# Patient Record
Sex: FEMALE | Race: ASIAN | NOT HISPANIC OR LATINO | Employment: UNEMPLOYED | URBAN - METROPOLITAN AREA
[De-identification: names, ages, dates, MRNs, and addresses within clinical notes are randomized per-mention and may not be internally consistent; named-entity substitution may affect disease eponyms.]

---

## 2017-01-18 DIAGNOSIS — R73.09 ELEVATED GLUCOSE: ICD-10-CM

## 2017-01-20 LAB
ALBUMIN SERPL-MCNC: 4.9 G/DL (ref 3.5–5.2)
ALBUMIN/GLOB SERPL: 2 G/DL
ALP SERPL-CCNC: 63 U/L (ref 39–117)
ALT SERPL-CCNC: 21 U/L (ref 1–33)
AST SERPL-CCNC: 25 U/L (ref 1–32)
BILIRUB SERPL-MCNC: 0.4 MG/DL (ref 0.1–1.2)
BUN SERPL-MCNC: 11 MG/DL (ref 6–20)
BUN/CREAT SERPL: 13.1 (ref 7–25)
CALCIUM SERPL-MCNC: 10 MG/DL (ref 8.6–10.5)
CHLORIDE SERPL-SCNC: 103 MMOL/L (ref 98–107)
CHOLEST SERPL-MCNC: 284 MG/DL (ref 0–200)
CO2 SERPL-SCNC: 28.2 MMOL/L (ref 22–29)
CREAT SERPL-MCNC: 0.84 MG/DL (ref 0.57–1)
GLOBULIN SER CALC-MCNC: 2.4 GM/DL
GLUCOSE SERPL-MCNC: 114 MG/DL (ref 65–99)
HBA1C MFR BLD: 5.5 % (ref 4.8–5.6)
HDLC SERPL-MCNC: 49 MG/DL (ref 40–60)
LDLC SERPL CALC-MCNC: 176 MG/DL (ref 0–100)
LDLC/HDLC SERPL: 3.6 {RATIO}
POTASSIUM SERPL-SCNC: 4.4 MMOL/L (ref 3.5–5.2)
PROT SERPL-MCNC: 7.3 G/DL (ref 6–8.5)
SODIUM SERPL-SCNC: 144 MMOL/L (ref 136–145)
TRIGL SERPL-MCNC: 293 MG/DL (ref 0–150)
VLDLC SERPL CALC-MCNC: 58.6 MG/DL (ref 5–40)

## 2017-01-24 ENCOUNTER — OFFICE VISIT (OUTPATIENT)
Dept: INTERNAL MEDICINE | Facility: CLINIC | Age: 62
End: 2017-01-24

## 2017-01-24 VITALS
HEIGHT: 61 IN | OXYGEN SATURATION: 96 % | DIASTOLIC BLOOD PRESSURE: 80 MMHG | BODY MASS INDEX: 26.73 KG/M2 | WEIGHT: 141.6 LBS | SYSTOLIC BLOOD PRESSURE: 120 MMHG | HEART RATE: 73 BPM

## 2017-01-24 DIAGNOSIS — E78.5 HYPERLIPIDEMIA, UNSPECIFIED HYPERLIPIDEMIA TYPE: ICD-10-CM

## 2017-01-24 DIAGNOSIS — E03.9 ADULT HYPOTHYROIDISM: Primary | ICD-10-CM

## 2017-01-24 PROCEDURE — 99214 OFFICE O/P EST MOD 30 MIN: CPT | Performed by: INTERNAL MEDICINE

## 2017-01-24 NOTE — MR AVS SNAPSHOT
Mariana Proctor   1/24/2017 8:15 AM   Office Visit    Dept Phone:  [phone]   Encounter #:  [id number]    Provider:  Clare Jara MD   Department:  [facility] INTERNAL MEDICINE                Your Full Care Plan              Today's Medication Changes          These changes are accurate as of: 1/24/17  8:49 AM.  If you have any questions, ask your nurse or doctor.               New Medication(s)Ordered:     pitavastatin calcium 2 MG tablet tablet   Commonly known as:  LIVALO   Take 1 tablet by mouth Every Night.   Started by:  Clare Jara MD         Stop taking medication(s)listed here:     ezetimibe 10 MG tablet   Commonly known as:  ZETIA   Stopped by:  Clare Jara MD                Where to Get Your Medications      These medications were sent to [id number] [place] [address] & [employer] [facility] [phone] [facility] [phone]   [id number] [place] [address]     Phone:  [phone]     pitavastatin calcium 2 MG tablet tablet                  Your Updated Medication List          This list is accurate as of: 1/24/17  8:49 AM.  Always use your most recent med list.                amitriptyline 10 MG tablet   Commonly known as:  ELAVIL   Take 1 tablet by mouth Every Night.       pitavastatin calcium 2 MG tablet tablet   Commonly known as:  LIVALO   Take 1 tablet by mouth Every Night.       SYNTHROID 88 MCG tablet   Generic drug:  levothyroxine   TAKE ONE TABLET BY MOUTH DAILY       vitamin D 55896 UNITS capsule capsule   Commonly known as:  ERGOCALCIFEROL   Take 1 capsule by mouth 1 (one) time per week.               You Were Diagnosed With        Codes Comments    Adult hypothyroidism    -  Primary ICD-10-CM: E03.9  ICD-9-CM: 244.9     Hyperlipidemia, unspecified hyperlipidemia type     ICD-10-CM: E78.5  ICD-9-CM: 272.4       Instructions     None    Patient Instructions History      Upcoming Appointments     Visit  "Type Date Time Department    OFFICE VISIT 2017  8:15 AM [facility]    [facility] 3/1/2017  8:50 AM [facility]    OFFICE VISIT 3/8/2017  7:45 AM [facility]      MyChart Signup     [facility] [employer] allows you to send messages to your doctor, view your test results, renew your prescriptions, schedule appointments, and more. To sign up, go to [employer] and click on the Sign Up Now link in the New User? box. Enter your [employer] Activation Code exactly as it appears below along with the last four digits of your Social Security Number and your Date of Birth () to complete the sign-up process. If you do not sign up before the expiration date, you must request a new code.    [employer] Activation Code: [email or web address]  Expires: 2/3/2017  8:53 AM    If you have questions, you can email [email or web address] or call [phone] to talk to our [employer] staff. Remember, [employer] is NOT to be used for urgent needs. For medical emergencies, dial 911.               Other Info from Your Visit           Your Appointments     Mar 01, 2017  8:50 AM EST   [facility]   [facility] INTERNAL MEDICINE (--)    [address] [place] [address]   [phone]            Mar 08, 2017  7:45 AM EST   Office Visit with Clare Jara MD   [facility] INTERNAL MEDICINE (--)    [address] [place] [address]   [phone]           Arrive 15 minutes prior to appointment.              Allergies     No Known Allergies      Reason for Visit     Hyperlipidemia     Hypothyroidism           Vital Signs     Blood Pressure Pulse Height Weight Oxygen Saturation Body Mass Index    120/80 (BP Location: Left arm, Patient Position: Sitting, Cuff Size: Adult) 73 60.5\" (153.7 cm) 141 lb 9.6 oz (64.2 kg) 96% 27.2 kg/m2    Smoking Status                   Never Smoker           Problems and Diagnoses Noted     Adult " hypothyroidism    High cholesterol or triglycerides

## 2017-01-24 NOTE — PROGRESS NOTES
Subjective   Mariana Proctor is a 61 y.o. female here to follow up on Hypothyroidism and High cholesterol.     History of Present Illness   SHe has not been able to tolerate statins  She has tried atorvastatin and lovastatin and zetia.  GI upset and muscle ache  Pt has been doing well with thyroid meds.  Taking as perscribed without any complications  She does think it makes her hot flashes worse    The following portions of the patient's history were reviewed and updated as appropriate: allergies, current medications, past medical history, past social history and problem list.  She does eat a healthy diet  She does not exercise reg  Review of Systems   All other systems reviewed and are negative.      Objective   Physical Exam   Constitutional: She is oriented to person, place, and time. She appears well-developed and well-nourished.   HENT:   Head: Normocephalic and atraumatic.   Right Ear: External ear normal.   Left Ear: External ear normal.   Mouth/Throat: Oropharynx is clear and moist.   Eyes: Conjunctivae and EOM are normal. Pupils are equal, round, and reactive to light.   Neck: Normal range of motion. No tracheal deviation present. No thyromegaly present.   Cardiovascular: Normal rate, regular rhythm, normal heart sounds and intact distal pulses.    Pulmonary/Chest: Effort normal and breath sounds normal.   Abdominal: Soft. Bowel sounds are normal. She exhibits no distension. There is no tenderness.   Musculoskeletal: Normal range of motion. She exhibits no edema or deformity.   Neurological: She is alert and oriented to person, place, and time.   Skin: Skin is warm and dry.   Psychiatric: She has a normal mood and affect. Her behavior is normal. Judgment and thought content normal.   Vitals reviewed.      Assessment/Plan   Mariana was seen today for hyperlipidemia and hypothyroidism.    Diagnoses and all orders for this visit:    Adult hypothyroidism  -     Comprehensive Metabolic Panel; Future  -      LP+LDL / HDL Ratio ([facility]); Future  -     TSH Rfx On Abnormal To Free T4; Future    Hyperlipidemia, unspecified hyperlipidemia type  -     Comprehensive Metabolic Panel; Future  -     LP+LDL / HDL Ratio ([facility]); Future  -     TSH Rfx On Abnormal To Free T4; Future    Other orders  -     pitavastatin calcium (LIVALO) 2 MG tablet tablet; Take 1 tablet by mouth Every Night.    1. HPL- she has not tolerated multiple statins and zetia  She has very high chol and a sig FH.  Try livavlo 2mg at night  2. Hypothyroidism- she is going to try to take the synthroid at night

## 2017-02-22 ENCOUNTER — APPOINTMENT (OUTPATIENT)
Dept: WOMENS IMAGING | Facility: HOSPITAL | Age: 62
End: 2017-02-22

## 2017-02-22 PROCEDURE — 77067 SCR MAMMO BI INCL CAD: CPT | Performed by: RADIOLOGY

## 2017-02-28 DIAGNOSIS — E78.5 HYPERLIPIDEMIA, UNSPECIFIED HYPERLIPIDEMIA TYPE: ICD-10-CM

## 2017-02-28 DIAGNOSIS — E03.9 ADULT HYPOTHYROIDISM: ICD-10-CM

## 2017-03-03 LAB
ALBUMIN SERPL-MCNC: 4.7 G/DL (ref 3.5–5.2)
ALBUMIN/GLOB SERPL: 2 G/DL
ALP SERPL-CCNC: 58 U/L (ref 39–117)
ALT SERPL-CCNC: 15 U/L (ref 1–33)
AST SERPL-CCNC: 19 U/L (ref 1–32)
BILIRUB SERPL-MCNC: 0.2 MG/DL (ref 0.1–1.2)
BUN SERPL-MCNC: 11 MG/DL (ref 6–20)
BUN/CREAT SERPL: 13.4 (ref 7–25)
CALCIUM SERPL-MCNC: 9.6 MG/DL (ref 8.6–10.5)
CHLORIDE SERPL-SCNC: 103 MMOL/L (ref 98–107)
CHOLEST SERPL-MCNC: 224 MG/DL (ref 0–200)
CO2 SERPL-SCNC: 28.3 MMOL/L (ref 22–29)
CREAT SERPL-MCNC: 0.82 MG/DL (ref 0.57–1)
GLOBULIN SER CALC-MCNC: 2.3 GM/DL
GLUCOSE SERPL-MCNC: 118 MG/DL (ref 65–99)
HDLC SERPL-MCNC: 40 MG/DL (ref 40–60)
LDLC SERPL CALC-MCNC: 126 MG/DL (ref 0–100)
LDLC/HDLC SERPL: 3.15 {RATIO}
POTASSIUM SERPL-SCNC: 4.3 MMOL/L (ref 3.5–5.2)
PROT SERPL-MCNC: 7 G/DL (ref 6–8.5)
SODIUM SERPL-SCNC: 143 MMOL/L (ref 136–145)
TRIGL SERPL-MCNC: 290 MG/DL (ref 0–150)
TSH SERPL DL<=0.005 MIU/L-ACNC: 1.01 MIU/ML (ref 0.27–4.2)
VLDLC SERPL CALC-MCNC: 58 MG/DL (ref 5–40)

## 2017-03-08 ENCOUNTER — OFFICE VISIT (OUTPATIENT)
Dept: INTERNAL MEDICINE | Facility: CLINIC | Age: 62
End: 2017-03-08

## 2017-03-08 VITALS
WEIGHT: 138.44 LBS | SYSTOLIC BLOOD PRESSURE: 120 MMHG | BODY MASS INDEX: 26.14 KG/M2 | HEART RATE: 77 BPM | DIASTOLIC BLOOD PRESSURE: 70 MMHG | HEIGHT: 61 IN | OXYGEN SATURATION: 98 %

## 2017-03-08 DIAGNOSIS — G47.00 INSOMNIA, UNSPECIFIED TYPE: ICD-10-CM

## 2017-03-08 DIAGNOSIS — E78.5 HYPERLIPIDEMIA, UNSPECIFIED HYPERLIPIDEMIA TYPE: Primary | ICD-10-CM

## 2017-03-08 DIAGNOSIS — G44.209 TENSION HEADACHE: ICD-10-CM

## 2017-03-08 PROCEDURE — 99213 OFFICE O/P EST LOW 20 MIN: CPT | Performed by: INTERNAL MEDICINE

## 2017-03-08 NOTE — PROGRESS NOTES
Subjective   Mariana Proctor is a 61 y.o. female.     Pt here to follow up on labs for HPL.  Pt has no concerns @ this time.    History of Present Illness   Pt has been taking cholesterol meds as prescribed.  She does have some muscle cramp mostly at night  She is not sure if it is the med or not   She says she is sleeping better with the amitriptyline that she had started for tension headaches.  Tension headaches seem to be better as well    The following portions of the patient's history were reviewed and updated as appropriate: allergies, current medications, past medical history, past social history and problem list.  She has been watching her diet    Review of Systems   All other systems reviewed and are negative.      Objective     Vitals:    03/08/17 0743   BP: 120/70   Pulse: 77   SpO2: 98%       Physical Exam   Constitutional: She is oriented to person, place, and time. She appears well-developed and well-nourished.   HENT:   Head: Normocephalic and atraumatic.   Right Ear: External ear normal.   Left Ear: External ear normal.   Mouth/Throat: Oropharynx is clear and moist.   Eyes: Conjunctivae and EOM are normal. Pupils are equal, round, and reactive to light.   Neck: Normal range of motion. No tracheal deviation present. No thyromegaly present.   Cardiovascular: Normal rate, regular rhythm, normal heart sounds and intact distal pulses.    Pulmonary/Chest: Effort normal and breath sounds normal.   Abdominal: Soft. Bowel sounds are normal. She exhibits no distension. There is no tenderness.   Musculoskeletal: Normal range of motion. She exhibits no edema or deformity.   Neurological: She is alert and oriented to person, place, and time.   Skin: Skin is warm and dry.   Psychiatric: She has a normal mood and affect. Her behavior is normal. Judgment and thought content normal.   Vitals reviewed.      Assessment/Plan   There are no diagnoses linked to this encounter.    1. HPL-some leg cramps at night but no  significant muscle aches.  I do not know whether or not this is from the Livalo.  We will go ahead and add some magnesium.  As long as patient's symptoms are doing okay she will continue this.  She will let me know if she does not improve  2. Insomnia-patient seems to be sleeping better.  We will continue the amitriptyline  3.  Headaches seem to be better with amitriptyline

## 2017-05-10 ENCOUNTER — OFFICE VISIT (OUTPATIENT)
Dept: INTERNAL MEDICINE | Facility: CLINIC | Age: 62
End: 2017-05-10

## 2017-05-10 VITALS
BODY MASS INDEX: 25.59 KG/M2 | HEIGHT: 61 IN | SYSTOLIC BLOOD PRESSURE: 120 MMHG | HEART RATE: 59 BPM | WEIGHT: 135.56 LBS | OXYGEN SATURATION: 98 % | DIASTOLIC BLOOD PRESSURE: 70 MMHG

## 2017-05-10 DIAGNOSIS — J06.9 ACUTE URI: Primary | ICD-10-CM

## 2017-05-10 DIAGNOSIS — J30.2 SEASONAL ALLERGIC RHINITIS, UNSPECIFIED ALLERGIC RHINITIS TRIGGER: ICD-10-CM

## 2017-05-10 PROCEDURE — 99213 OFFICE O/P EST LOW 20 MIN: CPT | Performed by: INTERNAL MEDICINE

## 2017-05-10 RX ORDER — AZELASTINE HYDROCHLORIDE, FLUTICASONE PROPIONATE 137; 50 UG/1; UG/1
2 SPRAY, METERED NASAL DAILY
Qty: 23 G | Refills: 2 | Status: SHIPPED | OUTPATIENT
Start: 2017-05-10 | End: 2018-03-14

## 2017-06-19 RX ORDER — LEVOTHYROXINE SODIUM 88 MCG
TABLET ORAL
Qty: 30 TABLET | Refills: 5 | Status: SHIPPED | OUTPATIENT
Start: 2017-06-19 | End: 2017-12-11 | Stop reason: SDUPTHER

## 2017-06-26 ENCOUNTER — OFFICE VISIT (OUTPATIENT)
Dept: INTERNAL MEDICINE | Facility: CLINIC | Age: 62
End: 2017-06-26

## 2017-06-26 VITALS
OXYGEN SATURATION: 96 % | SYSTOLIC BLOOD PRESSURE: 99 MMHG | DIASTOLIC BLOOD PRESSURE: 70 MMHG | BODY MASS INDEX: 25.51 KG/M2 | HEIGHT: 61 IN | HEART RATE: 63 BPM | WEIGHT: 135.1 LBS

## 2017-06-26 DIAGNOSIS — M54.9 UPPER BACK PAIN ON RIGHT SIDE: Primary | ICD-10-CM

## 2017-06-26 PROCEDURE — 99213 OFFICE O/P EST LOW 20 MIN: CPT | Performed by: INTERNAL MEDICINE

## 2017-06-26 NOTE — PROGRESS NOTES
Subjective   Mariana Proctor is a 62 y.o. female here today for body aches all over and neck pain that radiates down right arm.      History of Present Illness   She has pain medial to right shoulder blade and into right arm.  Worse in the am and at night  Worse when lying down.  Occasionally the pain radiates down her right arm.  She denies any chest pain.  No shortness of breath.  She does not know if it is really significantly worse than her baseline she has just been dealing with it on and off for many years    The following portions of the patient's history were reviewed and updated as appropriate: allergies, current medications, past medical history, past social history and problem list.  She denies any change in PA    Review of Systems   Musculoskeletal: Positive for back pain and neck pain.   All other systems reviewed and are negative.      Objective   Physical Exam   Constitutional: She is oriented to person, place, and time. She appears well-developed and well-nourished.   HENT:   Head: Normocephalic and atraumatic.   Right Ear: External ear normal.   Left Ear: External ear normal.   Mouth/Throat: Oropharynx is clear and moist.   Eyes: Conjunctivae and EOM are normal. Pupils are equal, round, and reactive to light.   Neck: Normal range of motion. No tracheal deviation present. No thyromegaly present.   Cardiovascular: Normal rate, regular rhythm, normal heart sounds and intact distal pulses.    Pulmonary/Chest: Effort normal and breath sounds normal.   Abdominal: Soft. Bowel sounds are normal. She exhibits no distension. There is no tenderness.   Musculoskeletal: Normal range of motion. She exhibits no edema or deformity.   Neurological: She is alert and oriented to person, place, and time.   Skin: Skin is warm and dry.   Psychiatric: She has a normal mood and affect. Her behavior is normal. Judgment and thought content normal.   Vitals reviewed.      Vitals:    06/26/17 0805   BP: 99/70   Pulse: 63   SpO2:  96%        Current Outpatient Prescriptions:   •  Azelastine-Fluticasone 137-50 MCG/ACT suspension, 2 sprays into each nostril Daily., Disp: 23 g, Rfl: 2  •  SYNTHROID 88 MCG tablet, TAKE ONE TABLET BY MOUTH DAILY, Disp: 30 tablet, Rfl: 5  •  vitamin D (ERGOCALCIFEROL) 22802 UNITS capsule capsule, Take 1 capsule by mouth 1 (one) time per week., Disp: 4 capsule, Rfl: 5      Assessment/Plan   Diagnoses and all orders for this visit:    Upper back pain on right side      1.  Upper back pain - sx for years  She denies working at a computer.  I discussed with patient different exercises to do that can help.  She did have a CT scan of the chest done several months back and again the symptoms have been going on for many years.  There is no significant worsening in her symptoms.  I'm going to refer her to physical therapy.  She will let me know if she does not improve with this

## 2017-07-31 ENCOUNTER — OFFICE VISIT (OUTPATIENT)
Dept: INTERNAL MEDICINE | Facility: CLINIC | Age: 62
End: 2017-07-31

## 2017-07-31 ENCOUNTER — HOSPITAL ENCOUNTER (OUTPATIENT)
Dept: GENERAL RADIOLOGY | Facility: HOSPITAL | Age: 62
Discharge: HOME | End: 2017-07-31
Admitting: NURSE PRACTITIONER

## 2017-07-31 VITALS
SYSTOLIC BLOOD PRESSURE: 138 MMHG | OXYGEN SATURATION: 98 % | HEIGHT: 61 IN | WEIGHT: 133.31 LBS | DIASTOLIC BLOOD PRESSURE: 82 MMHG | BODY MASS INDEX: 25.17 KG/M2 | HEART RATE: 83 BPM

## 2017-07-31 DIAGNOSIS — M54.9 UPPER BACK PAIN ON LEFT SIDE: Primary | ICD-10-CM

## 2017-07-31 PROCEDURE — 99213 OFFICE O/P EST LOW 20 MIN: CPT | Performed by: NURSE PRACTITIONER

## 2017-07-31 PROCEDURE — 72072 X-RAY EXAM THORAC SPINE 3VWS: CPT

## 2017-07-31 PROCEDURE — 96372 THER/PROPH/DIAG INJ SC/IM: CPT | Performed by: NURSE PRACTITIONER

## 2017-07-31 RX ORDER — CYCLOBENZAPRINE HCL 5 MG
5 TABLET ORAL 3 TIMES DAILY PRN
Qty: 30 TABLET | Refills: 0 | Status: SHIPPED | OUTPATIENT
Start: 2017-07-31 | End: 2017-09-06

## 2017-07-31 NOTE — PROGRESS NOTES
Subjective   Mariana Proctor is a 62 y.o. female. Patient is here today for   Chief Complaint   Patient presents with   • Back Pain     Pt complains of having upper back pain. Pt states that physical therapy thinks she should get an MRI.    .    History of Present Illness   C/O constant pain in upper back associated with pain down both arms, but left is worse. Left arm feels weak. States that she is having sharp pain.   She has been taking Ibuprofen with minimal relief. She has been in physical therapy. Patient states that the physical therapist recommends that she gets an MRI.  Patient has had this pain off and on for years and it usually will get better, but this has been pretty constant for about a month.  Denies any type of injury.    The following portions of the patient's history were reviewed and updated as appropriate: allergies, current medications, past family history, past medical history, past social history, past surgical history and problem list.    Review of Systems   Constitutional: Negative.    Respiratory: Negative.    Cardiovascular: Negative.    Musculoskeletal: Positive for back pain.       Objective   Vitals:    07/31/17 1430   BP: 138/82   Pulse: 83   SpO2: 98%     Physical Exam   Constitutional: Vital signs are normal. She appears well-developed and well-nourished.   Cardiovascular: Normal rate, regular rhythm and normal heart sounds.    Pulmonary/Chest: Effort normal and breath sounds normal.   Musculoskeletal:        Right shoulder: She exhibits tenderness and spasm. She exhibits normal range of motion, no bony tenderness and no swelling.        Arms:  Area of tenderness   Skin: Skin is warm, dry and intact.   Psychiatric: She has a normal mood and affect. Her speech is normal and behavior is normal. Thought content normal.   Nursing note and vitals reviewed.      Assessment/Plan   Mariana was seen today for back pain.    Diagnoses and all orders for this visit:    Upper back pain on left  side  -     XR spine thoracic 3 vw  -     ketorolac (TORADOL) injection 30 mg; Inject 1 mL into the shoulder, thigh, or buttocks 1 (One) Time.  -     cyclobenzaprine (FLEXERIL) 5 MG tablet; Take 1 tablet by mouth 3 (Three) Times a Day As Needed for Muscle Spasms.     explained to patient that we would need to get an x-ray prior to ordering an MRI.  Patient has had a CT scan of her chest in the past, but that was just to look at the stability of some pulmonary nodules.

## 2017-08-01 ENCOUNTER — TELEPHONE (OUTPATIENT)
Dept: INTERNAL MEDICINE | Facility: CLINIC | Age: 62
End: 2017-08-01

## 2017-08-01 DIAGNOSIS — M54.9 UPPER BACK PAIN ON LEFT SIDE: Primary | ICD-10-CM

## 2017-08-01 DIAGNOSIS — E78.5 HYPERLIPIDEMIA, UNSPECIFIED HYPERLIPIDEMIA TYPE: ICD-10-CM

## 2017-08-01 NOTE — TELEPHONE ENCOUNTER
----- Message from RONNA Mancia sent at 8/1/2017  1:14 PM EDT -----  Please let patient know that her x-ray showed minimal arthritis changes. I will refer her to ortho/back specialist. They can determine if an MRI is needed or different treatment

## 2017-09-02 LAB
ALBUMIN SERPL-MCNC: 4.7 G/DL (ref 3.5–5.2)
ALBUMIN/GLOB SERPL: 1.9 G/DL
ALP SERPL-CCNC: 57 U/L (ref 39–117)
ALT SERPL-CCNC: 15 U/L (ref 1–33)
AST SERPL-CCNC: 20 U/L (ref 1–32)
BILIRUB SERPL-MCNC: 0.2 MG/DL (ref 0.1–1.2)
BUN SERPL-MCNC: 12 MG/DL (ref 8–23)
BUN/CREAT SERPL: 17.4 (ref 7–25)
CALCIUM SERPL-MCNC: 9.8 MG/DL (ref 8.6–10.5)
CHLORIDE SERPL-SCNC: 106 MMOL/L (ref 98–107)
CHOLEST SERPL-MCNC: 227 MG/DL (ref 0–200)
CO2 SERPL-SCNC: 26.4 MMOL/L (ref 22–29)
CREAT SERPL-MCNC: 0.69 MG/DL (ref 0.57–1)
GLOBULIN SER CALC-MCNC: 2.5 GM/DL
GLUCOSE SERPL-MCNC: 110 MG/DL (ref 65–99)
HCV AB S/CO SERPL IA: <0.1 S/CO RATIO (ref 0–0.9)
HDLC SERPL-MCNC: 43 MG/DL (ref 40–60)
LDLC SERPL CALC-MCNC: 137 MG/DL (ref 0–100)
LDLC/HDLC SERPL: 3.19 {RATIO}
POTASSIUM SERPL-SCNC: 4.1 MMOL/L (ref 3.5–5.2)
PROT SERPL-MCNC: 7.2 G/DL (ref 6–8.5)
SODIUM SERPL-SCNC: 145 MMOL/L (ref 136–145)
TRIGL SERPL-MCNC: 235 MG/DL (ref 0–150)
TSH SERPL DL<=0.005 MIU/L-ACNC: 1.2 MIU/ML (ref 0.27–4.2)
VLDLC SERPL CALC-MCNC: 47 MG/DL (ref 5–40)

## 2017-09-06 ENCOUNTER — OFFICE VISIT (OUTPATIENT)
Dept: INTERNAL MEDICINE | Facility: CLINIC | Age: 62
End: 2017-09-06

## 2017-09-06 VITALS
SYSTOLIC BLOOD PRESSURE: 110 MMHG | WEIGHT: 137.7 LBS | DIASTOLIC BLOOD PRESSURE: 68 MMHG | OXYGEN SATURATION: 98 % | HEART RATE: 72 BPM | HEIGHT: 60 IN | BODY MASS INDEX: 27.03 KG/M2

## 2017-09-06 DIAGNOSIS — E78.5 HYPERLIPIDEMIA, UNSPECIFIED HYPERLIPIDEMIA TYPE: Primary | ICD-10-CM

## 2017-09-06 DIAGNOSIS — R30.0 DYSURIA: ICD-10-CM

## 2017-09-06 DIAGNOSIS — E04.1 THYROID NODULE: ICD-10-CM

## 2017-09-06 DIAGNOSIS — E03.9 ADULT HYPOTHYROIDISM: ICD-10-CM

## 2017-09-06 DIAGNOSIS — E55.9 AVITAMINOSIS D: ICD-10-CM

## 2017-09-06 LAB
BILIRUB BLD-MCNC: NEGATIVE MG/DL
CLARITY, POC: CLEAR
COLOR UR: YELLOW
GLUCOSE UR STRIP-MCNC: NEGATIVE MG/DL
KETONES UR QL: NEGATIVE
LEUKOCYTE EST, POC: ABNORMAL
NITRITE UR-MCNC: NEGATIVE MG/ML
PH UR: 5.5 [PH] (ref 5–8)
PROT UR STRIP-MCNC: NEGATIVE MG/DL
RBC # UR STRIP: ABNORMAL /UL
SP GR UR: 1 (ref 1–1.03)
UROBILINOGEN UR QL: NORMAL

## 2017-09-06 PROCEDURE — 99213 OFFICE O/P EST LOW 20 MIN: CPT | Performed by: INTERNAL MEDICINE

## 2017-09-06 PROCEDURE — 81003 URINALYSIS AUTO W/O SCOPE: CPT | Performed by: INTERNAL MEDICINE

## 2017-09-06 NOTE — PROGRESS NOTES
Subjective   Mariana Proctor is a 62 y.o. female here today to f/u on hypothyroidism, hyperlipidemia and vitamin d def.  Pt c/o her thyroid hurting.      History of Present Illness   She says she feels some pain over her thyroid on the right  No dysphagia  Pt has been doing well with thyroid meds.  Taking as perscribed without any complications  Patient does report some difficulty with urinary leakage.    The following portions of the patient's history were reviewed and updated as appropriate: allergies, current medications, past medical history, past social history and problem list.  She does eat  Healthy diet    Review of Systems   All other systems reviewed and are negative.      Objective   Physical Exam   Constitutional: She is oriented to person, place, and time. She appears well-developed and well-nourished.   HENT:   Head: Normocephalic and atraumatic.   Right Ear: External ear normal.   Left Ear: External ear normal.   Mouth/Throat: Oropharynx is clear and moist.   Eyes: Conjunctivae and EOM are normal. Pupils are equal, round, and reactive to light.   Neck: Normal range of motion. No tracheal deviation present. No thyromegaly present.   Cardiovascular: Normal rate, regular rhythm, normal heart sounds and intact distal pulses.    Pulmonary/Chest: Effort normal and breath sounds normal.   Abdominal: Soft. Bowel sounds are normal. She exhibits no distension. There is no tenderness.   Musculoskeletal: Normal range of motion. She exhibits no edema or deformity.   Neurological: She is alert and oriented to person, place, and time.   Skin: Skin is warm and dry.   Psychiatric: She has a normal mood and affect. Her behavior is normal. Judgment and thought content normal.   Vitals reviewed.      Vitals:    09/06/17 0759   BP: 110/68   Pulse: 72   SpO2: 98%     Telephone on 08/01/2017   Component Date Value Ref Range Status   • Total Cholesterol 09/01/2017 227* 0 - 200 mg/dL Final   • Triglycerides 09/01/2017 235* 0 -  150 mg/dL Final   • HDL Cholesterol 09/01/2017 43  40 - 60 mg/dL Final   • VLDL Cholesterol 09/01/2017 47* 5 - 40 mg/dL Final   • LDL Cholesterol  09/01/2017 137* 0 - 100 mg/dL Final   • LDL/HDL Ratio 09/01/2017 3.19   Final   • TSH 09/01/2017 1.20  0.27 - 4.2 mIU/mL Final   • Hep C Virus Ab 09/01/2017 <0.1  0.0 - 0.9 s/co ratio Final    Comment:                                   Negative:     < 0.8                               Indeterminate: 0.8 - 0.9                                    Positive:     > 0.9   The CDC recommends that a positive HCV antibody result   be followed up with a HCV Nucleic Acid Amplification   test ([id number]).     • Glucose 09/01/2017 110* 65 - 99 mg/dL Final   • BUN 09/01/2017 12  8 - 23 mg/dL Final   • Creatinine 09/01/2017 0.69  0.57 - 1.00 mg/dL Final   • eGFR Non  Am 09/01/2017 87  >60 mL/min/1.73 Final   • eGFR African Am 09/01/2017 105  >60 mL/min/1.73 Final   • BUN/Creatinine Ratio 09/01/2017 17.4  7.0 - 25.0 Final   • Sodium 09/01/2017 145  136 - 145 mmol/L Final   • Potassium 09/01/2017 4.1  3.5 - 5.2 mmol/L Final   • Chloride 09/01/2017 106  98 - 107 mmol/L Final   • Total CO2 09/01/2017 26.4  22.0 - 29.0 mmol/L Final   • Calcium 09/01/2017 9.8  8.6 - 10.5 mg/dL Final   • Total Protein 09/01/2017 7.2  6.0 - 8.5 g/dL Final   • Albumin 09/01/2017 4.70  3.50 - 5.20 g/dL Final   • Globulin 09/01/2017 2.5  gm/dL Final   • A/G Ratio 09/01/2017 1.9  g/dL Final   • Total Bilirubin 09/01/2017 0.2  0.1 - 1.2 mg/dL Final   • Alkaline Phosphatase 09/01/2017 57  39 - 117 U/L Final   • AST (SGOT) 09/01/2017 20  1 - 32 U/L Final   • ALT (SGPT) 09/01/2017 15  1 - 33 U/L Final       Current Outpatient Prescriptions:   •  Azelastine-Fluticasone 137-50 MCG/ACT suspension, 2 sprays into each nostril Daily., Disp: 23 g, Rfl: 2  •  SYNTHROID 88 MCG tablet, TAKE ONE TABLET BY MOUTH DAILY, Disp: 30 tablet, Rfl: 5       Assessment/Plan   There are no diagnoses linked to this encounter.    1. HPl-   She is going to cont to work on diet and exercise  2. Hypothyroidism- She is doing well with current dose  3.  Dysuria-  She has some wbc and RBCs-  Given her sx we herson check a culture  SHe has had a neg butler for hematuria in the past

## 2017-09-08 LAB
BACTERIA UR CULT: NO GROWTH
BACTERIA UR CULT: NORMAL

## 2017-09-11 ENCOUNTER — HOSPITAL ENCOUNTER (OUTPATIENT)
Dept: ULTRASOUND IMAGING | Facility: HOSPITAL | Age: 62
Discharge: HOME | End: 2017-09-11
Admitting: INTERNAL MEDICINE

## 2017-09-11 DIAGNOSIS — E04.1 THYROID NODULE: ICD-10-CM

## 2017-09-11 PROCEDURE — 76536 US EXAM OF HEAD AND NECK: CPT

## 2017-09-11 RX ORDER — ERGOCALCIFEROL 1.25 MG/1
50000 CAPSULE ORAL
Qty: 4 CAPSULE | Refills: 0 | Status: SHIPPED | OUTPATIENT
Start: 2017-09-11 | End: 2018-03-14

## 2017-09-11 RX ORDER — ERGOCALCIFEROL 1.25 MG/1
CAPSULE ORAL
Qty: 4 CAPSULE | OUTPATIENT
Start: 2017-09-11

## 2017-09-18 ENCOUNTER — TELEPHONE (OUTPATIENT)
Dept: INTERNAL MEDICINE | Facility: CLINIC | Age: 62
End: 2017-09-18

## 2017-09-18 NOTE — TELEPHONE ENCOUNTER
----- Message from Shanna Talamantes sent at 9/18/2017  1:45 PM EDT -----  Regarding: US RESULTS  Patient asking about her US results. Please call.

## 2017-12-12 RX ORDER — LEVOTHYROXINE SODIUM 88 MCG
TABLET ORAL
Qty: 30 TABLET | Refills: 5 | Status: SHIPPED | OUTPATIENT
Start: 2017-12-12 | End: 2018-06-14 | Stop reason: SDUPTHER

## 2018-03-06 ENCOUNTER — RESULTS ENCOUNTER (OUTPATIENT)
Dept: INTERNAL MEDICINE | Facility: CLINIC | Age: 63
End: 2018-03-06

## 2018-03-06 DIAGNOSIS — E78.5 HYPERLIPIDEMIA, UNSPECIFIED HYPERLIPIDEMIA TYPE: ICD-10-CM

## 2018-03-06 DIAGNOSIS — E03.9 ADULT HYPOTHYROIDISM: ICD-10-CM

## 2018-03-06 DIAGNOSIS — E55.9 AVITAMINOSIS D: ICD-10-CM

## 2018-03-13 LAB
25(OH)D3+25(OH)D2 SERPL-MCNC: 14.7 NG/ML (ref 30–100)
ALBUMIN SERPL-MCNC: 4.3 G/DL (ref 3.5–5.2)
ALBUMIN/GLOB SERPL: 1.8 G/DL
ALP SERPL-CCNC: 56 U/L (ref 39–117)
ALT SERPL-CCNC: 18 U/L (ref 1–33)
AST SERPL-CCNC: 17 U/L (ref 1–32)
BILIRUB SERPL-MCNC: 0.5 MG/DL (ref 0.1–1.2)
BUN SERPL-MCNC: 14 MG/DL (ref 8–23)
BUN/CREAT SERPL: 19.7 (ref 7–25)
CALCIUM SERPL-MCNC: 9.6 MG/DL (ref 8.6–10.5)
CHLORIDE SERPL-SCNC: 104 MMOL/L (ref 98–107)
CHOLEST SERPL-MCNC: 251 MG/DL (ref 0–200)
CO2 SERPL-SCNC: 27.1 MMOL/L (ref 22–29)
CREAT SERPL-MCNC: 0.71 MG/DL (ref 0.57–1)
GFR SERPLBLD CREATININE-BSD FMLA CKD-EPI: 102 ML/MIN/1.73
GFR SERPLBLD CREATININE-BSD FMLA CKD-EPI: 84 ML/MIN/1.73
GLOBULIN SER CALC-MCNC: 2.4 GM/DL
GLUCOSE SERPL-MCNC: 109 MG/DL (ref 65–99)
HDLC SERPL-MCNC: 41 MG/DL (ref 40–60)
LDLC SERPL CALC-MCNC: 146 MG/DL (ref 0–100)
LDLC/HDLC SERPL: 3.57 {RATIO}
POTASSIUM SERPL-SCNC: 4.1 MMOL/L (ref 3.5–5.2)
PROT SERPL-MCNC: 6.7 G/DL (ref 6–8.5)
SODIUM SERPL-SCNC: 144 MMOL/L (ref 136–145)
TRIGL SERPL-MCNC: 318 MG/DL (ref 0–150)
TSH SERPL DL<=0.005 MIU/L-ACNC: 0.45 MIU/ML (ref 0.27–4.2)
VLDLC SERPL CALC-MCNC: 63.6 MG/DL (ref 5–40)

## 2018-03-14 ENCOUNTER — OFFICE VISIT (OUTPATIENT)
Dept: INTERNAL MEDICINE | Facility: CLINIC | Age: 63
End: 2018-03-14

## 2018-03-14 VITALS
SYSTOLIC BLOOD PRESSURE: 128 MMHG | HEART RATE: 71 BPM | WEIGHT: 139.7 LBS | TEMPERATURE: 98 F | OXYGEN SATURATION: 95 % | DIASTOLIC BLOOD PRESSURE: 82 MMHG | BODY MASS INDEX: 27.43 KG/M2 | HEIGHT: 60 IN

## 2018-03-14 DIAGNOSIS — E55.9 AVITAMINOSIS D: ICD-10-CM

## 2018-03-14 DIAGNOSIS — Z00.00 HEALTH CARE MAINTENANCE: Primary | ICD-10-CM

## 2018-03-14 DIAGNOSIS — K21.9 GASTROESOPHAGEAL REFLUX DISEASE, ESOPHAGITIS PRESENCE NOT SPECIFIED: ICD-10-CM

## 2018-03-14 DIAGNOSIS — E03.9 ADULT HYPOTHYROIDISM: ICD-10-CM

## 2018-03-14 DIAGNOSIS — Z12.11 SCREENING FOR COLON CANCER: ICD-10-CM

## 2018-03-14 PROCEDURE — 99396 PREV VISIT EST AGE 40-64: CPT | Performed by: INTERNAL MEDICINE

## 2018-03-14 RX ORDER — ERGOCALCIFEROL 1.25 MG/1
50000 CAPSULE ORAL WEEKLY
Qty: 8 CAPSULE | Refills: 1 | Status: SHIPPED | OUTPATIENT
Start: 2018-03-14

## 2018-03-14 RX ORDER — OMEPRAZOLE 40 MG/1
40 CAPSULE, DELAYED RELEASE ORAL DAILY
Qty: 30 CAPSULE | Refills: 2 | Status: SHIPPED | OUTPATIENT
Start: 2018-03-14 | End: 2022-01-10

## 2018-03-14 NOTE — PROGRESS NOTES
"Subjective   Mariana Proctor is a 62 y.o. female and is here for a comprehensive physical exam. The patient reports problems - hypothyroidism.  Pt has been doing well with thyroid meds.  Taking as perscribed without any complications    Pt is UTD with annual gyn exam and mammo utd with gyn    Do you take any herbs or supplements that were not prescribed by a doctor? none      Social History: She does not exercise reg  She does eat a healthy diet most of the time      Social History     Social History   • Marital status:      Spouse name: Britton Proctor   • Number of children: 3   • Years of education: N/A     Occupational History   • homemaker      Social History Main Topics   • Smoking status: Never Smoker   • Smokeless tobacco: Never Used   • Alcohol use No   • Drug use: No   • Sexual activity: Not on file     Other Topics Concern   • Not on file     Social History Narrative    [age] and 25 yo    2 grandchildren       Family History:   Family History   Problem Relation Age of Onset   • Diabetes Mother    • Hypertension Mother    • Hypertension Father    • Stroke Father 47   • Heart disease Brother        Past Medical History:   Past Medical History:   Diagnosis Date   • Anxiety    • GERD (gastroesophageal reflux disease)    • HLD (hyperlipidemia)    • HTN (hypertension)    • Hypothyroidism    • IBS (irritable bowel syndrome)    • Prediabetes    • Vitamin B deficiency    • Vitamin D deficiency            Review of Systems    A comprehensive review of systems was negative.    Objective   /82 (BP Location: Left arm, Patient Position: Sitting)   Pulse 71   Temp 98 °F (36.7 °C) (Oral)   Ht 152.5 cm (60.05\")   Wt 63.4 kg (139 lb 11.2 oz)   SpO2 95%   BMI 27.24 kg/m²     General Appearance:    Alert, cooperative, no distress, appears stated age   Head:    Normocephalic, without obvious abnormality, atraumatic   Eyes:    PERRL, conjunctiva/corneas clear, EOM's intact, fundi     benign, both eyes   Ears:    " Normal TM's and external ear canals, both ears   Nose:   Nares normal, septum midline, mucosa normal, no drainage    or sinus tenderness   Throat:   Lips, mucosa, and tongue normal; teeth and gums normal   Neck:   Supple, symmetrical, trachea midline, no adenopathy;     thyroid:  no enlargement/tenderness/nodules; no carotid    bruit or JVD   Back:     Symmetric, no curvature, ROM normal, no CVA tenderness   Lungs:     Clear to auscultation bilaterally, respirations unlabored   Chest Wall:    No tenderness or deformity    Heart:    Regular rate and rhythm, S1 and S2 normal, no murmur, rub   or gallop       Abdomen:     Soft, non-tender, bowel sounds active all four quadrants,     no masses, no organomegaly           Extremities:   Extremities normal, atraumatic, no cyanosis or edema   Pulses:   2+ and symmetric all extremities   Skin:   Skin color, texture, turgor normal, no rashes or lesions   Lymph nodes:   Cervical, supraclavicular, and axillary nodes normal   Neurologic:   CNII-XII intact, normal strength, sensation and reflexes     throughout       Medications:   Current Outpatient Prescriptions:   •  Probiotic Product (PROBIOTIC DAILY PO), Take 1 tablet by mouth Daily., Disp: , Rfl:   •  diphenoxylate-atropine (LOMOTIL) 2.5-0.025 MG per tablet, Take 2 tablets by mouth 4 (Four) Times a Day As Needed for Diarrhea., Disp: 16 tablet, Rfl: 0  •  SYNTHROID 88 MCG tablet, TAKE ONE TABLET BY MOUTH DAILY, Disp: 30 tablet, Rfl: 5       Assessment/Plan   Healthy female exam.      1. Healthcare Maintenance:  2. Patient Counseling:  --Nutrition: Stressed importance of moderation in sodium/caffeine intake, saturated fat and cholesterol, caloric balance, sufficient intake of fresh fruits, vegetables, fiber, calcium and vit D  --Exercise: I have rec daily exercise with light weights  --Substance Abuse: no to rare etoh  --Dental health: She has not been lately but she is going to schedule  --Immunizations  [email or web address]  --Discussed benefits of screening colonoscopy.  3. GERD- she is having more sx and take occas OTC meds.  We will try omeprazole  4.  HPL- she is going to work on diet and exercise

## 2018-03-14 NOTE — PATIENT INSTRUCTIONS
"Fat and Cholesterol Restricted Diet  Getting too much fat and cholesterol in your diet may cause health problems. Following this diet helps keep your fat and cholesterol at normal levels. This can keep you from getting sick.  What types of fat should I choose?  · Choose monosaturated and polyunsaturated fats. These are found in foods such as olive oil, canola oil, flaxseeds, walnuts, almonds, and seeds.  · Eat more omega-3 fats. Good choices include salmon, mackerel, sardines, tuna, flaxseed oil, and ground flaxseeds.  · Limit saturated fats. These are in animal products such as meats, butter, and cream. They can also be in plant products such as palm oil, palm kernel oil, and coconut oil.  · Avoid foods with partially hydrogenated oils in them. These contain trans fats. Examples of foods that have trans fats are stick margarine, some tub margarines, cookies, crackers, and other baked goods.  What general guidelines do I need to follow?  · Check food labels. Look for the words \"trans fat\" and \"saturated fat.\"  · When preparing a meal:  ¨ Fill half of your plate with vegetables and green salads.  ¨ Fill one fourth of your plate with whole grains. Look for the word \"whole\" as the first word in the ingredient list.  ¨ Fill one fourth of your plate with lean protein foods.  · Eat more foods that have fiber, like apples, carrots, beans, peas, and barley.  · Eat more home-cooked foods. Eat less at restaurants and buffets.  · Limit or avoid alcohol.  · Limit foods high in starch and sugar.  · Limit fried foods.  · Cook foods without frying them. Baking, boiling, grilling, and broiling are all great options.  · Lose weight if you are overweight. Losing even a small amount of weight can help your overall health. It can also help prevent diseases such as diabetes and heart disease.  What foods can I eat?  Grains   Whole grains, such as whole wheat or whole grain breads, crackers, cereals, and pasta. Unsweetened oatmeal, " bulgur, barley, quinoa, or brown rice. Corn or whole wheat flour tortillas.  Vegetables   Fresh or frozen vegetables (raw, steamed, roasted, or grilled). Green salads.  Fruits   All fresh, canned (in natural juice), or frozen fruits.  Meat and Other Protein Products   Ground beef (85% or leaner), grass-fed beef, or beef trimmed of fat. Skinless chicken or turkey. Ground chicken or turkey. Pork trimmed of fat. All fish and seafood. Eggs. Dried beans, peas, or lentils. Unsalted nuts or seeds. Unsalted canned or dry beans.  Dairy   Low-fat dairy products, such as skim or 1% milk, 2% or reduced-fat cheeses, low-fat ricotta or cottage cheese, or plain low-fat yogurt.  Fats and Oils   Tub margarines without trans fats. Light or reduced-fat mayonnaise and salad dressings. Avocado. Olive, canola, sesame, or safflower oils. Natural peanut or almond butter (choose ones without added sugar and oil).  The items listed above may not be a complete list of recommended foods or beverages. Contact your dietitian for more options.   What foods are not recommended?  Grains   White bread. White pasta. White rice. Cornbread. Bagels, pastries, and croissants. Crackers that contain trans fat.  Vegetables   White potatoes. Corn. Creamed or fried vegetables. Vegetables in a cheese sauce.  Fruits   Dried fruits. Canned fruit in light or heavy syrup. Fruit juice.  Meat and Other Protein Products   Fatty cuts of meat. Ribs, chicken wings, wesley, sausage, bologna, salami, chitterlings, fatback, hot dogs, bratwurst, and packaged luncheon meats. Liver and organ meats.  Dairy   Whole or 2% milk, cream, half-and-half, and cream cheese. Whole milk cheeses. Whole-fat or sweetened yogurt. Full-fat cheeses. Nondairy creamers and whipped toppings. Processed cheese, cheese spreads, or cheese curds.  Sweets and Desserts   Corn syrup, sugars, honey, and molasses. Candy. Jam and jelly. Syrup. Sweetened cereals. Cookies, pies, cakes, donuts, muffins, and ice  cream.  Fats and Oils   Butter, stick margarine, lard, shortening, ghee, or wesley fat. Coconut, palm kernel, or palm oils.  Beverages   Alcohol. Sweetened drinks (such as sodas, lemonade, and fruit drinks or punches).  The items listed above may not be a complete list of foods and beverages to avoid. Contact your dietitian for more information.   This information is not intended to replace advice given to you by your health care provider. Make sure you discuss any questions you have with your health care provider.  Document Released: 06/18/2013 Document Revised: 08/24/2017 Document Reviewed: 03/19/2015  [employer] Patient Education © 2017 [employer]

## 2018-06-14 RX ORDER — LEVOTHYROXINE SODIUM 88 MCG
TABLET ORAL
Qty: 30 TABLET | Refills: 5 | Status: SHIPPED | OUTPATIENT
Start: 2018-06-14 | End: 2018-12-12 | Stop reason: SDUPTHER

## 2018-09-14 ENCOUNTER — RESULTS ENCOUNTER (OUTPATIENT)
Dept: INTERNAL MEDICINE | Facility: CLINIC | Age: 63
End: 2018-09-14

## 2018-09-14 DIAGNOSIS — E55.9 AVITAMINOSIS D: ICD-10-CM

## 2018-09-14 DIAGNOSIS — E03.9 ADULT HYPOTHYROIDISM: ICD-10-CM

## 2018-09-27 ENCOUNTER — APPOINTMENT (OUTPATIENT)
Dept: WOMENS IMAGING | Facility: HOSPITAL | Age: 63
End: 2018-09-27

## 2018-09-27 PROCEDURE — 77063 BREAST TOMOSYNTHESIS BI: CPT | Performed by: RADIOLOGY

## 2018-09-27 PROCEDURE — 77067 SCR MAMMO BI INCL CAD: CPT | Performed by: RADIOLOGY

## 2018-12-11 RX ORDER — LEVOTHYROXINE SODIUM 88 MCG
TABLET ORAL
Qty: 30 TABLET | Refills: 4 | OUTPATIENT
Start: 2018-12-11

## 2018-12-12 RX ORDER — LEVOTHYROXINE SODIUM 88 MCG
TABLET ORAL
Qty: 30 TABLET | Refills: 1 | Status: SHIPPED | OUTPATIENT
Start: 2018-12-12 | End: 2021-12-02

## 2020-02-05 ENCOUNTER — APPOINTMENT (OUTPATIENT)
Dept: WOMENS IMAGING | Facility: HOSPITAL | Age: 65
End: 2020-02-05

## 2020-02-05 PROCEDURE — 76641 ULTRASOUND BREAST COMPLETE: CPT | Performed by: RADIOLOGY

## 2020-02-05 PROCEDURE — 77066 DX MAMMO INCL CAD BI: CPT | Performed by: RADIOLOGY

## 2020-02-05 PROCEDURE — G0279 TOMOSYNTHESIS, MAMMO: HCPCS | Performed by: RADIOLOGY

## 2020-02-05 PROCEDURE — 77062 BREAST TOMOSYNTHESIS BI: CPT | Performed by: RADIOLOGY

## 2020-10-20 ENCOUNTER — TRANSCRIBE ORDERS (OUTPATIENT)
Dept: ADMINISTRATIVE | Facility: HOSPITAL | Age: 65
End: 2020-10-20

## 2020-10-20 DIAGNOSIS — R07.9 CHEST PAIN, UNSPECIFIED TYPE: Primary | ICD-10-CM

## 2020-10-28 ENCOUNTER — APPOINTMENT (OUTPATIENT)
Dept: CARDIOLOGY | Facility: HOSPITAL | Age: 65
End: 2020-10-28

## 2020-10-29 ENCOUNTER — HOSPITAL ENCOUNTER (OUTPATIENT)
Dept: CARDIOLOGY | Facility: HOSPITAL | Age: 65
Discharge: HOME | End: 2020-10-29
Admitting: INTERNAL MEDICINE

## 2020-10-29 DIAGNOSIS — R07.9 CHEST PAIN, UNSPECIFIED TYPE: ICD-10-CM

## 2020-10-29 LAB
BH CV STRESS BP STAGE 1: NORMAL
BH CV STRESS BP STAGE 2: NORMAL
BH CV STRESS BP STAGE 3: NORMAL
BH CV STRESS BP STAGE 4: NORMAL
BH CV STRESS DURATION MIN STAGE 1: 3
BH CV STRESS DURATION MIN STAGE 2: 3
BH CV STRESS DURATION MIN STAGE 3: 3
BH CV STRESS DURATION MIN STAGE 4: 1
BH CV STRESS DURATION SEC STAGE 1: 0
BH CV STRESS DURATION SEC STAGE 2: 0
BH CV STRESS DURATION SEC STAGE 3: 0
BH CV STRESS DURATION SEC STAGE 4: 5
BH CV STRESS GRADE STAGE 1: 10
BH CV STRESS GRADE STAGE 2: 12
BH CV STRESS GRADE STAGE 3: 14
BH CV STRESS GRADE STAGE 4: 16
BH CV STRESS HR STAGE 1: 110
BH CV STRESS HR STAGE 2: 124
BH CV STRESS HR STAGE 3: 135
BH CV STRESS HR STAGE 4: 152
BH CV STRESS METS STAGE 1: 5
BH CV STRESS METS STAGE 2: 7.5
BH CV STRESS METS STAGE 3: 10
BH CV STRESS METS STAGE 4: 13.5
BH CV STRESS PROTOCOL 1: NORMAL
BH CV STRESS RECOVERY BP: NORMAL MMHG
BH CV STRESS RECOVERY HR: 76 BPM
BH CV STRESS SPEED STAGE 1: 1.7
BH CV STRESS SPEED STAGE 2: 2.5
BH CV STRESS SPEED STAGE 3: 3.4
BH CV STRESS SPEED STAGE 4: 4.2
BH CV STRESS STAGE 1: 1
BH CV STRESS STAGE 2: 2
BH CV STRESS STAGE 3: 3
BH CV STRESS STAGE 4: 4
MAXIMAL PREDICTED HEART RATE: 157 BPM
PERCENT MAX PREDICTED HR: 98.09 %
STRESS BASELINE BP: NORMAL MMHG
STRESS BASELINE HR: 54 BPM
STRESS PERCENT HR: 115 %
STRESS POST ESTIMATED WORKLOAD: 11.9 METS
STRESS POST EXERCISE DUR MIN: 10 MIN
STRESS POST EXERCISE DUR SEC: 5 SEC
STRESS POST PEAK BP: NORMAL MMHG
STRESS POST PEAK HR: 154 BPM
STRESS TARGET HR: 133 BPM

## 2020-10-29 PROCEDURE — 93017 CV STRESS TEST TRACING ONLY: CPT

## 2020-10-29 PROCEDURE — 93016 CV STRESS TEST SUPVJ ONLY: CPT | Performed by: INTERNAL MEDICINE

## 2020-10-29 PROCEDURE — 93018 CV STRESS TEST I&R ONLY: CPT | Performed by: INTERNAL MEDICINE

## 2021-03-22 ENCOUNTER — BULK ORDERING (OUTPATIENT)
Dept: CASE MANAGEMENT | Facility: OTHER | Age: 66
End: 2021-03-22

## 2021-03-22 DIAGNOSIS — Z23 IMMUNIZATION DUE: ICD-10-CM

## 2021-04-19 ENCOUNTER — APPOINTMENT (OUTPATIENT)
Dept: WOMENS IMAGING | Facility: HOSPITAL | Age: 66
End: 2021-04-19

## 2021-11-19 ENCOUNTER — TELEPHONE (OUTPATIENT)
Dept: FAMILY MEDICINE CLINIC | Facility: CLINIC | Age: 66
End: 2021-11-19

## 2021-11-19 NOTE — TELEPHONE ENCOUNTER
Please reach out to this lady and accept her for my pt and tell her  I'm glad he's doing well.    Message text      Charisma Denton MA routed conversation to Jori Reinoso MD Yesterday (7:18 AM)     Car Proctor James, MD 2 days ago     [facility] Dr Reinoso.   Thanks so much for treating my cyst.      My wife Mariana spoke to you about changing to you as her PCP. You suggested I send her info on [employer].  Her information follows.      Mariana Proctor  Age:  06/19/1956  Address:  [address] [place], [place] [id number]  Insurance:  [employer] (I am the sponsor)  Insurance # [id number]  Home Phone: [phone]  Cell: [phone]     Thanks so much!  Gwyn Proctor

## 2021-12-02 ENCOUNTER — OFFICE VISIT (OUTPATIENT)
Dept: FAMILY MEDICINE CLINIC | Facility: CLINIC | Age: 66
End: 2021-12-02

## 2021-12-02 VITALS
BODY MASS INDEX: 25.32 KG/M2 | HEART RATE: 60 BPM | HEIGHT: 60 IN | RESPIRATION RATE: 14 BRPM | WEIGHT: 129 LBS | DIASTOLIC BLOOD PRESSURE: 81 MMHG | SYSTOLIC BLOOD PRESSURE: 130 MMHG | TEMPERATURE: 96.8 F

## 2021-12-02 DIAGNOSIS — E03.9 ADULT HYPOTHYROIDISM: Chronic | ICD-10-CM

## 2021-12-02 DIAGNOSIS — Z00.00 WELLNESS EXAMINATION: Primary | ICD-10-CM

## 2021-12-02 PROCEDURE — 99386 PREV VISIT NEW AGE 40-64: CPT | Performed by: FAMILY MEDICINE

## 2021-12-02 RX ORDER — LEVOTHYROXINE SODIUM 0.07 MG/1
75 TABLET ORAL DAILY
COMMUNITY
End: 2021-12-02

## 2021-12-02 RX ORDER — LEVOTHYROXINE SODIUM 88 UG/1
88 TABLET ORAL DAILY
Qty: 90 TABLET | Refills: 3 | Status: SHIPPED | OUTPATIENT
Start: 2021-12-02 | End: 2021-12-06 | Stop reason: SDUPTHER

## 2021-12-02 NOTE — PROGRESS NOTES
"Subjective   Mariana Proctor is a 66 y.o. female.     CC: Establishment of Care for Wellness Exam    History of Present Illness     Pt presents today to establish care and for a wellness exam. Pt reports feeling well and w/o issues currently.    The following portions of the patient's history were reviewed and updated as appropriate: allergies, current medications, past family history, past medical history, past social history, past surgical history and problem list.    Review of Systems   Constitutional: Negative for activity change, chills and fever.   Respiratory: Negative for cough.    Cardiovascular: Negative for chest pain.   Psychiatric/Behavioral: Negative for dysphoric mood.       /81   Pulse 60   Temp 96.8 °F (36 °C) (Oral)   Resp 14   Ht 152.4 cm (60\")   Wt 58.5 kg (129 lb)   BMI 25.19 kg/m²     Objective   Physical Exam  Constitutional:       General: She is not in acute distress.     Appearance: She is well-developed.   Cardiovascular:      Rate and Rhythm: Normal rate and regular rhythm.   Pulmonary:      Effort: Pulmonary effort is normal.      Breath sounds: Normal breath sounds.   Neurological:      Mental Status: She is alert and oriented to person, place, and time.   Psychiatric:         Behavior: Behavior normal.         Thought Content: Thought content normal.         Assessment/Plan   Diagnoses and all orders for this visit:    1. Wellness examination (Primary)  -     Comprehensive metabolic panel  -     Lipid panel  -     CBC and Differential    2. Adult hypothyroidism  -     TSH  -     T4, Free  -     levothyroxine (SYNTHROID, LEVOTHROID) 88 MCG tablet; Take 1 tablet by mouth Daily.  Dispense: 90 tablet; Refill: 3    Healthy diet and exercise discussed with pt.           "

## 2021-12-03 LAB
ALBUMIN SERPL-MCNC: 4.7 G/DL (ref 3.8–4.8)
ALBUMIN/GLOB SERPL: 2.1 {RATIO} (ref 1.2–2.2)
ALP SERPL-CCNC: 59 IU/L (ref 44–121)
ALT SERPL-CCNC: 19 IU/L (ref 0–32)
AST SERPL-CCNC: 23 IU/L (ref 0–40)
BASOPHILS # BLD AUTO: 0 X10E3/UL (ref 0–0.2)
BASOPHILS NFR BLD AUTO: 1 %
BILIRUB SERPL-MCNC: 0.4 MG/DL (ref 0–1.2)
BUN SERPL-MCNC: 13 MG/DL (ref 8–27)
BUN/CREAT SERPL: 16 (ref 12–28)
CALCIUM SERPL-MCNC: 9.6 MG/DL (ref 8.7–10.3)
CHLORIDE SERPL-SCNC: 104 MMOL/L (ref 96–106)
CHOLEST SERPL-MCNC: 254 MG/DL (ref 100–199)
CO2 SERPL-SCNC: 25 MMOL/L (ref 20–29)
CREAT SERPL-MCNC: 0.79 MG/DL (ref 0.57–1)
EOSINOPHIL # BLD AUTO: 0.1 X10E3/UL (ref 0–0.4)
EOSINOPHIL NFR BLD AUTO: 1 %
ERYTHROCYTE [DISTWIDTH] IN BLOOD BY AUTOMATED COUNT: 11.7 % (ref 11.7–15.4)
GLOBULIN SER CALC-MCNC: 2.2 G/DL (ref 1.5–4.5)
GLUCOSE SERPL-MCNC: 108 MG/DL (ref 65–99)
HCT VFR BLD AUTO: 39.4 % (ref 34–46.6)
HDLC SERPL-MCNC: 56 MG/DL
HGB BLD-MCNC: 12.8 G/DL (ref 11.1–15.9)
IMM GRANULOCYTES # BLD AUTO: 0 X10E3/UL (ref 0–0.1)
IMM GRANULOCYTES NFR BLD AUTO: 0 %
LDLC SERPL CALC-MCNC: 172 MG/DL (ref 0–99)
LYMPHOCYTES # BLD AUTO: 1.9 X10E3/UL (ref 0.7–3.1)
LYMPHOCYTES NFR BLD AUTO: 36 %
MCH RBC QN AUTO: 31.1 PG (ref 26.6–33)
MCHC RBC AUTO-ENTMCNC: 32.5 G/DL (ref 31.5–35.7)
MCV RBC AUTO: 96 FL (ref 79–97)
MONOCYTES # BLD AUTO: 0.4 X10E3/UL (ref 0.1–0.9)
MONOCYTES NFR BLD AUTO: 7 %
NEUTROPHILS # BLD AUTO: 3 X10E3/UL (ref 1.4–7)
NEUTROPHILS NFR BLD AUTO: 55 %
PLATELET # BLD AUTO: 240 X10E3/UL (ref 150–450)
POTASSIUM SERPL-SCNC: 4.2 MMOL/L (ref 3.5–5.2)
PROT SERPL-MCNC: 6.9 G/DL (ref 6–8.5)
RBC # BLD AUTO: 4.12 X10E6/UL (ref 3.77–5.28)
SODIUM SERPL-SCNC: 140 MMOL/L (ref 134–144)
T4 FREE SERPL-MCNC: 1.57 NG/DL (ref 0.82–1.77)
TRIGL SERPL-MCNC: 146 MG/DL (ref 0–149)
TSH SERPL DL<=0.005 MIU/L-ACNC: 0.42 UIU/ML (ref 0.45–4.5)
VLDLC SERPL CALC-MCNC: 26 MG/DL (ref 5–40)
WBC # BLD AUTO: 5.3 X10E3/UL (ref 3.4–10.8)

## 2021-12-03 NOTE — PROGRESS NOTES
Your labs look good, although your glucose and LDL need to come down.  Keep exercising and watching your diet closely.

## 2021-12-06 ENCOUNTER — PATIENT MESSAGE (OUTPATIENT)
Dept: FAMILY MEDICINE CLINIC | Facility: CLINIC | Age: 66
End: 2021-12-06

## 2021-12-06 DIAGNOSIS — E03.9 ADULT HYPOTHYROIDISM: Chronic | ICD-10-CM

## 2021-12-06 RX ORDER — LEVOTHYROXINE SODIUM 88 UG/1
88 TABLET ORAL DAILY
Qty: 90 TABLET | Refills: 3 | Status: SHIPPED | OUTPATIENT
Start: 2021-12-06 | End: 2022-07-12 | Stop reason: SDUPTHER

## 2021-12-06 NOTE — TELEPHONE ENCOUNTER
[facility] says they do not have a new prescription for Thyroid medication for me. I have only 2 days left.     I forgot to mention to you that I much prefer Synthroid over generic form.  I have had bad experiences when I tried generic in the past.     Kenneth Chance requires a letter from the doctors office for them to cover the cost. Can your office help with that?      PT LAST SEEN  12/02/2021

## 2022-01-08 NOTE — PROGRESS NOTES
"Subjective   Mariana Proctor is a 66 y.o. female.     CC: Side/Back Pain    History of Present Illness     Pt comes in today c/o right side and flank pain since the weekend. Pt with h/o lumbar DDD but has worsened over the weekend and is mainly lower back and to the right side \"burns\". No meds tried.      The following portions of the patient's history were reviewed and updated as appropriate: allergies, current medications, past family history, past medical history, past social history, past surgical history and problem list.    Review of Systems   Constitutional: Negative for activity change, chills and fever.   Respiratory: Negative for cough.    Cardiovascular: Negative for chest pain.   Psychiatric/Behavioral: Negative for dysphoric mood.       /81   Pulse 58   Temp 97.1 °F (36.2 °C)   Resp 16   Ht 152.4 cm (60\")   Wt 59 kg (130 lb)   SpO2 98%   BMI 25.39 kg/m²     Objective   Physical Exam  Constitutional:       General: She is not in acute distress.     Appearance: She is well-developed.   Pulmonary:      Effort: Pulmonary effort is normal.   Neurological:      Mental Status: She is alert and oriented to person, place, and time.   Psychiatric:         Behavior: Behavior normal.         Thought Content: Thought content normal.     U/a: NEGATIVE  L-Spine: ordered due to pain, no comparison, read by me: some old DDD but nothing acute.    Assessment/Plan   Diagnoses and all orders for this visit:    1. Right-sided low back pain without sciatica, unspecified chronicity (Primary)  -     POC Urinalysis Dipstick, Automated  -     XR Spine Lumbar 2 or 3 View    Heat/stretching discussed.           "
Color consistent with ethnicity/race, warm, dry intact, resilient.

## 2022-01-10 ENCOUNTER — OFFICE VISIT (OUTPATIENT)
Dept: FAMILY MEDICINE CLINIC | Facility: CLINIC | Age: 67
End: 2022-01-10

## 2022-01-10 VITALS
SYSTOLIC BLOOD PRESSURE: 135 MMHG | BODY MASS INDEX: 25.52 KG/M2 | HEART RATE: 58 BPM | TEMPERATURE: 97.1 F | DIASTOLIC BLOOD PRESSURE: 81 MMHG | RESPIRATION RATE: 16 BRPM | OXYGEN SATURATION: 98 % | WEIGHT: 130 LBS | HEIGHT: 60 IN

## 2022-01-10 DIAGNOSIS — M54.50 RIGHT-SIDED LOW BACK PAIN WITHOUT SCIATICA, UNSPECIFIED CHRONICITY: Primary | ICD-10-CM

## 2022-01-10 LAB
BILIRUB BLD-MCNC: NEGATIVE MG/DL
CLARITY, POC: CLEAR
COLOR UR: YELLOW
EXPIRATION DATE: ABNORMAL
GLUCOSE UR STRIP-MCNC: NEGATIVE MG/DL
KETONES UR QL: NEGATIVE
LEUKOCYTE EST, POC: NEGATIVE
Lab: ABNORMAL
NITRITE UR-MCNC: NEGATIVE MG/ML
PH UR: 5.5 [PH] (ref 5–8)
PROT UR STRIP-MCNC: NEGATIVE MG/DL
RBC # UR STRIP: ABNORMAL /UL
SP GR UR: 1.01 (ref 1–1.03)
UROBILINOGEN UR QL: NORMAL

## 2022-01-10 PROCEDURE — 81003 URINALYSIS AUTO W/O SCOPE: CPT | Performed by: FAMILY MEDICINE

## 2022-01-10 PROCEDURE — 72100 X-RAY EXAM L-S SPINE 2/3 VWS: CPT | Performed by: FAMILY MEDICINE

## 2022-01-10 PROCEDURE — 99213 OFFICE O/P EST LOW 20 MIN: CPT | Performed by: FAMILY MEDICINE

## 2022-01-10 RX ORDER — CYCLOBENZAPRINE HCL 10 MG
10 TABLET ORAL NIGHTLY
Qty: 15 TABLET | Refills: 1 | Status: SHIPPED | OUTPATIENT
Start: 2022-01-10 | End: 2022-01-11 | Stop reason: SDUPTHER

## 2022-01-11 ENCOUNTER — TELEPHONE (OUTPATIENT)
Dept: FAMILY MEDICINE CLINIC | Facility: CLINIC | Age: 67
End: 2022-01-11

## 2022-01-11 DIAGNOSIS — M54.50 RIGHT-SIDED LOW BACK PAIN WITHOUT SCIATICA, UNSPECIFIED CHRONICITY: ICD-10-CM

## 2022-01-11 RX ORDER — CYCLOBENZAPRINE HCL 10 MG
10 TABLET ORAL NIGHTLY
Qty: 15 TABLET | Refills: 1 | Status: SHIPPED | OUTPATIENT
Start: 2022-01-11 | End: 2022-07-06

## 2022-01-11 NOTE — TELEPHONE ENCOUNTER
PATIENT STATES SHE WAS IN OFFICE YESTERDAY AND PRESCRIBED TWO MEDICATION BUT PHARMACY STATES THEY HAVE NOT RECEIVED ANYTHING TODAY       CALLBACK [phone]    MIKHAIL [address] [address] [facility] [place] [employer] [place] - [phone] [facility] [phone] FX

## 2022-01-11 NOTE — TELEPHONE ENCOUNTER
Spoke with patients  and let him know we will resend to the pharmacy but it did show verification that it went through.     Sharon

## 2022-07-05 PROBLEM — R73.01 IFG (IMPAIRED FASTING GLUCOSE): Status: ACTIVE | Noted: 2022-07-05

## 2022-07-05 NOTE — PROGRESS NOTES
"Subjective   Mariana Proctor is a 67 y.o. female.     History of Present Illness     Chief Complaint:   Chief Complaint   Patient presents with   • IFG (impaired fasting glucose) (Primary)   • Hyperlipidemia, unspecified hyperlipidemia type   • Adult hypothyroidism   • Shoulder Pain     Symptoms for 2 weeks         Mariana Proctor 67 y.o. female who presents today for Medical Management of the below listed issues. She  has a problem list of   Patient Active Problem List   Diagnosis   • Allergic rhinitis   • Anxiety   • HLD (hyperlipidemia)   • Adult hypothyroidism   • Cannot sleep   • Adaptive colitis   • Lung mass   • Awareness of heartbeats   • Borderline diabetes   • B12 deficiency   • Avitaminosis D   • Tension headache   • Upper back pain on left side   • IFG (impaired fasting glucose)   .  Since the last visit, She has overall felt well, although her right shoulder started hurting w/o known reason and is difficult to rest. Also some neck pain with this, too. Pt is active and exercises a lot.  she has been compliant with   Current Outpatient Medications:   •  levothyroxine (SYNTHROID, LEVOTHROID) 88 MCG tablet, Take 1 tablet by mouth Daily., Disp: 90 tablet, Rfl: 3  •  vitamin D (ERGOCALCIFEROL) 65243 units capsule capsule, Take 1 capsule by mouth 1 (One) Time Per Week., Disp: 8 capsule, Rfl: 1  •  baclofen (LIORESAL) 10 MG tablet, Take 1 tablet by mouth Every Night., Disp: 30 tablet, Rfl: 1  •  celecoxib (CeleBREX) 200 MG capsule, Take 1 capsule by mouth Daily., Disp: 30 capsule, Rfl: 1.  She denies medication side effects.    All of the other chronic condition(s) listed above are stable w/o issues.    /83   Pulse 55   Temp 97.8 °F (36.6 °C) (Skin)   Resp 16   Ht 152.4 cm (60\")   Wt 59 kg (130 lb)   SpO2 98%   BMI 25.39 kg/m²     Results for orders placed or performed in visit on 01/10/22   POC Urinalysis Dipstick, Automated    Specimen: Urine   Result Value Ref Range    Color Yellow Yellow, Straw, " Dark Yellow, Celia    Clarity, UA Clear Clear    Specific Gravity  1.010 1.005 - 1.030    pH, Urine 5.5 5.0 - 8.0    Leukocytes Negative Negative    Nitrite, UA Negative Negative    Protein, POC Negative Negative mg/dL    Glucose, UA Negative Negative, 1000 mg/dL (3+) mg/dL    Ketones, UA Negative Negative    Urobilinogen, UA Normal Normal    Bilirubin Negative Negative    Blood, UA Trace (A) Negative    Lot Number [id number]     Expiration Date 07/31/2022              The following portions of the patient's history were reviewed and updated as appropriate: allergies, current medications, past family history, past medical history, past social history, past surgical history, and problem list.    Review of Systems   Constitutional: Negative for activity change, chills and fever.   Respiratory: Negative for cough.    Cardiovascular: Negative for chest pain.   Musculoskeletal: Positive for neck pain.        Right shoulder pain   Psychiatric/Behavioral: Negative for dysphoric mood.       Objective   Physical Exam  Constitutional:       General: She is not in acute distress.     Appearance: She is well-developed.   Cardiovascular:      Rate and Rhythm: Normal rate and regular rhythm.   Pulmonary:      Effort: Pulmonary effort is normal.      Breath sounds: Normal breath sounds.   Musculoskeletal:         General: No tenderness (no shoulder pathology on exam, but rather right arm discomfort with manipulation of the neck, mainly with leftward tilt). Normal range of motion.   Neurological:      Mental Status: She is alert and oriented to person, place, and time.   Psychiatric:         Behavior: Behavior normal.         Thought Content: Thought content normal.     XR C-Spine: ordered due to radicular pain, no comparison, read by me: loss of lordosis and some DDD noted        Diagnoses and all orders for this visit:    1. Cervicalgia (Primary)  -     XR Spine Cervical Complete 4 or 5 View  -     celecoxib (CeleBREX) 200 MG  capsule; Take 1 capsule by mouth Daily.  Dispense: 30 capsule; Refill: 1  -     baclofen (LIORESAL) 10 MG tablet; Take 1 tablet by mouth Every Night.  Dispense: 30 tablet; Refill: 1    2. IFG (impaired fasting glucose)  -     Basic Metabolic Panel  -     Hemoglobin A1c    3. Hyperlipidemia, unspecified hyperlipidemia type  -     Lipid Panel    4. Adult hypothyroidism  -     TSH  -     T4, Free    Diet/exercise/weight management to treat the glucose discussed.  Heat/stretching discussed.

## 2022-07-06 ENCOUNTER — OFFICE VISIT (OUTPATIENT)
Dept: FAMILY MEDICINE CLINIC | Facility: CLINIC | Age: 67
End: 2022-07-06

## 2022-07-06 VITALS
BODY MASS INDEX: 25.52 KG/M2 | OXYGEN SATURATION: 98 % | SYSTOLIC BLOOD PRESSURE: 150 MMHG | WEIGHT: 130 LBS | TEMPERATURE: 97.8 F | HEIGHT: 60 IN | DIASTOLIC BLOOD PRESSURE: 83 MMHG | RESPIRATION RATE: 16 BRPM | HEART RATE: 55 BPM

## 2022-07-06 DIAGNOSIS — E78.5 HYPERLIPIDEMIA, UNSPECIFIED HYPERLIPIDEMIA TYPE: ICD-10-CM

## 2022-07-06 DIAGNOSIS — M54.2 CERVICALGIA: Primary | ICD-10-CM

## 2022-07-06 DIAGNOSIS — R73.01 IFG (IMPAIRED FASTING GLUCOSE): ICD-10-CM

## 2022-07-06 DIAGNOSIS — E03.9 ADULT HYPOTHYROIDISM: ICD-10-CM

## 2022-07-06 PROCEDURE — 99214 OFFICE O/P EST MOD 30 MIN: CPT | Performed by: FAMILY MEDICINE

## 2022-07-06 PROCEDURE — 72050 X-RAY EXAM NECK SPINE 4/5VWS: CPT | Performed by: FAMILY MEDICINE

## 2022-07-06 RX ORDER — CELECOXIB 200 MG/1
200 CAPSULE ORAL DAILY
Qty: 30 CAPSULE | Refills: 1 | Status: SHIPPED | OUTPATIENT
Start: 2022-07-06

## 2022-07-06 RX ORDER — BACLOFEN 10 MG/1
10 TABLET ORAL NIGHTLY
Qty: 30 TABLET | Refills: 1 | Status: SHIPPED | OUTPATIENT
Start: 2022-07-06

## 2022-07-06 RX ORDER — LEVOTHYROXINE SODIUM 88 UG/1
1 TABLET ORAL DAILY
COMMUNITY
Start: 2022-06-07 | End: 2022-07-06

## 2022-07-07 LAB
BUN SERPL-MCNC: 14 MG/DL (ref 8–27)
BUN/CREAT SERPL: 19 (ref 12–28)
CALCIUM SERPL-MCNC: 9.3 MG/DL (ref 8.7–10.3)
CHLORIDE SERPL-SCNC: 105 MMOL/L (ref 96–106)
CHOLEST SERPL-MCNC: 216 MG/DL (ref 100–199)
CO2 SERPL-SCNC: 26 MMOL/L (ref 20–29)
CREAT SERPL-MCNC: 0.74 MG/DL (ref 0.57–1)
EGFRCR SERPLBLD CKD-EPI 2021: 90 ML/MIN/1.73
GLUCOSE SERPL-MCNC: 111 MG/DL (ref 65–99)
HBA1C MFR BLD: 6 % (ref 4.8–5.6)
HDLC SERPL-MCNC: 53 MG/DL
LDLC SERPL CALC-MCNC: 146 MG/DL (ref 0–99)
POTASSIUM SERPL-SCNC: 4.1 MMOL/L (ref 3.5–5.2)
SODIUM SERPL-SCNC: 142 MMOL/L (ref 134–144)
T4 FREE SERPL-MCNC: 1.66 NG/DL (ref 0.82–1.77)
TRIGL SERPL-MCNC: 98 MG/DL (ref 0–149)
TSH SERPL DL<=0.005 MIU/L-ACNC: 0.33 UIU/ML (ref 0.45–4.5)
VLDLC SERPL CALC-MCNC: 17 MG/DL (ref 5–40)

## 2022-07-08 DIAGNOSIS — E03.9 ADULT HYPOTHYROIDISM: Primary | ICD-10-CM

## 2022-07-12 ENCOUNTER — TELEPHONE (OUTPATIENT)
Dept: FAMILY MEDICINE CLINIC | Facility: CLINIC | Age: 67
End: 2022-07-12

## 2022-07-12 DIAGNOSIS — E03.9 ADULT HYPOTHYROIDISM: Chronic | ICD-10-CM

## 2022-07-12 RX ORDER — LEVOTHYROXINE SODIUM 88 UG/1
88 TABLET ORAL DAILY
Qty: 90 TABLET | Refills: 3 | Status: SHIPPED | OUTPATIENT
Start: 2022-07-12 | End: 2023-01-10 | Stop reason: SDUPTHER

## 2022-07-12 NOTE — TELEPHONE ENCOUNTER
Hi Dr Reinoso,The prescription you gave me was for generic Levothyroxine. I much prefer Synthroid as I have had issues with generic in the past. The pharmacy said your office denied Synthroid when they called to check.   Can you please change the prescription to Synthroid and let the pharmacy know?       Thank you.   Mariana Proctor

## 2022-12-03 NOTE — PROGRESS NOTES
The ABCs of the Annual Wellness Visit  Welcome to Medicare Visit    Chief Complaint   Patient presents with   • WELCOME TO [facility]     COLONOSCOPY DUE       Subjective {   History of Present Illness:  Mariana Proctor is a 67 y.o. female who presents for a  Welcome to Medicare Visit.    The following portions of the patient's history were reviewed and   updated as appropriate: allergies, current medications, past family history, past medical history, past social history, past surgical history and problem list.     Compared to one year ago, the patient feels her physical   health is the same.    Compared to one year ago, the patient feels her mental   health is the same.    Recent Hospitalizations:  She was not admitted to the hospital during the last year.       Current Medical Providers:  Patient Care Team:  Jori Reinoso MD as PCP - General (Family Medicine)    Outpatient Medications Prior to Visit   Medication Sig Dispense Refill   • baclofen (LIORESAL) 10 MG tablet Take 1 tablet by mouth Every Night. 30 tablet 1   • celecoxib (CeleBREX) 200 MG capsule Take 1 capsule by mouth Daily. 30 capsule 1   • levothyroxine (SYNTHROID, LEVOTHROID) 88 MCG tablet Take 1 tablet by mouth Daily. Give Name Brand Synthroid only 90 tablet 3   • vitamin D (ERGOCALCIFEROL) 34340 units capsule capsule Take 1 capsule by mouth 1 (One) Time Per Week. 8 capsule 1     No facility-administered medications prior to visit.       No opioid medication identified on active medication list. I have reviewed chart for other potential  high risk medication/s and harmful drug interactions in the elderly.          Aspirin is not on active medication list.  Aspirin use is not indicated based on review of current medical condition/s. Risk of harm outweighs potential benefits.  .    Patient Active Problem List   Diagnosis   • Allergic rhinitis   • Anxiety   • HLD (hyperlipidemia)   • Adult hypothyroidism   • Cannot sleep   • Adaptive colitis   • Lung  "mass   • Awareness of heartbeats   • Borderline diabetes   • B12 deficiency   • Avitaminosis D   • Tension headache   • Upper back pain on left side   • IFG (impaired fasting glucose)     Advance Care Planning  Advance Directive is not on file.  ACP discussion was held with the patient during this visit. Patient does not have an advance directive, declines further assistance.    Review of Systems   Constitutional: Negative for chills and fever.   HENT: Negative for congestion and sinus pressure.    Eyes: Negative for visual disturbance.   Respiratory: Negative for cough and shortness of breath.    Cardiovascular: Negative for chest pain.   Gastrointestinal: Negative for abdominal pain.   Genitourinary: Negative for dysuria.   Skin: Negative for rash.   Hematological: Negative for adenopathy.   Psychiatric/Behavioral: Negative for dysphoric mood.        Objective      Vitals:    12/05/22 0915   BP: 144/86   Pulse: 60   Resp: 14   Temp: 97.2 °F (36.2 °C)   TempSrc: Oral   Weight: 57.6 kg (127 lb)   Height: 152.4 cm (60\")   PainSc: 0-No pain     Estimated body mass index is 24.8 kg/m² as calculated from the following:    Height as of this encounter: 152.4 cm (60\").    Weight as of this encounter: 57.6 kg (127 lb).    BMI is >= 25 and <30. (Overweight) The following options were offered after discussion;: exercise counseling/recommendations and nutrition counseling/recommendations      Does the patient have evidence of cognitive impairment? No    Physical Exam  Constitutional:       General: She is not in acute distress.     Appearance: She is well-developed.   Cardiovascular:      Rate and Rhythm: Normal rate and regular rhythm.   Pulmonary:      Effort: Pulmonary effort is normal.      Breath sounds: Normal breath sounds.   Neurological:      Mental Status: She is alert and oriented to person, place, and time.   Psychiatric:         Behavior: Behavior normal.         Thought Content: Thought content normal.          "     Procedures       HEALTH RISK ASSESSMENT    Smoking Status:  Social History     Tobacco Use   Smoking Status Never   Smokeless Tobacco Never     Alcohol Consumption:  Social History     Substance and Sexual Activity   Alcohol Use No       Fall Risk Screen:    SIMADI Fall Risk Assessment was completed, and patient is at LOW risk for falls.Assessment completed on:7/6/2022    Depression Screen:   PHQ-2/PHQ-9 Depression Screening 12/5/2022   Retired PHQ-9 Total Score -   Retired Total Score -   Little Interest or Pleasure in Doing Things 0-->not at all   Feeling Down, Depressed or Hopeless 0-->not at all   PHQ-9: Brief Depression Severity Measure Score 0       Health Habits and Functional and Cognitive Screening:  Functional & Cognitive Status 12/5/2022   Do you have difficulty preparing food and eating? No   Do you have difficulty bathing yourself, getting dressed or grooming yourself? No   Do you have difficulty using the toilet? No   Do you have difficulty moving around from place to place? No   Do you have trouble with steps or getting out of a bed or a chair? No   Current Diet Well Balanced Diet   Dental Exam Up to date   Eye Exam Up to date   Exercise (times per week) 0 times per week   Current Exercises Include No Regular Exercise   Do you need help using the phone?  No   Are you deaf or do you have serious difficulty hearing?  No   Do you need help with transportation? No   Do you need help shopping? No   Do you need help preparing meals?  No   Do you need help with housework?  No   Do you need help with laundry? No   Do you need help taking your medications? No   Do you need help managing money? No   Do you ever drive or ride in a car without wearing a seat belt? No   Have you felt unusual stress, anger or loneliness in the last month? No   Who do you live with? Spouse   If you need help, do you have trouble finding someone available to you? Yes   Have you been bothered in the last four weeks by sexual  problems? No       Visual Acuity:    Vision Screening    Right eye Left eye Both eyes   Without correction 20/40 20/20 20/20   With correction          Age-appropriate Screening Schedule:  Refer to the list below for future screening recommendations based on patient's age, sex and/or medical conditions. Orders for these recommended tests are listed in the plan section. The patient has been provided with a written plan.    Health Maintenance   Topic Date Due   • DXA SCAN  Never done   • PAP SMEAR  02/22/2020   • ZOSTER VACCINE (2 of 2) 12/11/2022 (Originally 11/1/2017)   • INFLUENZA VACCINE  03/31/2023 (Originally 8/1/2022)   • LIPID PANEL  07/06/2023   • MAMMOGRAM  09/01/2023   • TDAP/TD VACCINES (3 - Td or Tdap) 06/04/2029          Assessment & Plan   CMS Preventative Services Quick Reference  Risk Factors Identified During Encounter  None Identified  The above risks/problems have been discussed with the patient.  Pertinent information has been shared with the patient in the After Visit Summary.  Follow up plans and orders are seen below in the Assessment/Plan Section.    Diagnoses and all orders for this visit:    1. Welcome to Medicare preventive visit (Primary)    2. IFG (impaired fasting glucose)  -     Hemoglobin A1c  -     Urinalysis With Culture If Indicated - Urine, Clean Catch    3. Adult hypothyroidism  -     TSH  -     T4, Free  -     T3    4. Screening for colon cancer  -     Ambulatory Referral to Gastroenterology        Follow Up:   Return in about 1 year (around 12/5/2023) for Annual physical.     An After Visit Summary and PPPS were made available to the patient.        I spent 15 minutes caring for Mariana on this date of service. This time includes time spent by me in the following activities:preparing for the visit, reviewing tests and ordering medications, tests, or procedures

## 2022-12-05 ENCOUNTER — OFFICE VISIT (OUTPATIENT)
Dept: FAMILY MEDICINE CLINIC | Facility: CLINIC | Age: 67
End: 2022-12-05

## 2022-12-05 VITALS
WEIGHT: 127 LBS | RESPIRATION RATE: 14 BRPM | BODY MASS INDEX: 24.94 KG/M2 | SYSTOLIC BLOOD PRESSURE: 144 MMHG | HEIGHT: 60 IN | TEMPERATURE: 97.2 F | DIASTOLIC BLOOD PRESSURE: 86 MMHG | HEART RATE: 60 BPM

## 2022-12-05 DIAGNOSIS — Z00.00 WELCOME TO MEDICARE PREVENTIVE VISIT: Primary | ICD-10-CM

## 2022-12-05 DIAGNOSIS — R73.01 IFG (IMPAIRED FASTING GLUCOSE): ICD-10-CM

## 2022-12-05 DIAGNOSIS — E03.9 ADULT HYPOTHYROIDISM: ICD-10-CM

## 2022-12-05 DIAGNOSIS — Z12.11 SCREENING FOR COLON CANCER: ICD-10-CM

## 2022-12-05 PROCEDURE — 1159F MED LIST DOCD IN RCRD: CPT | Performed by: FAMILY MEDICINE

## 2022-12-05 PROCEDURE — 1160F RVW MEDS BY RX/DR IN RCRD: CPT | Performed by: FAMILY MEDICINE

## 2022-12-05 PROCEDURE — G0402 INITIAL PREVENTIVE EXAM: HCPCS | Performed by: FAMILY MEDICINE

## 2022-12-05 PROCEDURE — G0403 EKG FOR INITIAL PREVENT EXAM: HCPCS | Performed by: FAMILY MEDICINE

## 2022-12-05 PROCEDURE — 1170F FXNL STATUS ASSESSED: CPT | Performed by: FAMILY MEDICINE

## 2022-12-05 NOTE — PROGRESS NOTES
Procedure     ECG 12 Lead    Date/Time: 12/5/2022 9:46 AM  Performed by: Jori Reinoso MD  Authorized by: Jori Reinoso MD   Comparison: compared with previous ECG from 5/18/2016  Similar to previous ECG  Rhythm: sinus rhythm  Rate: normal  Conduction: conduction normal  ST Segments: ST segments normal  T Waves: T waves normal  QRS axis: normal  Other: no other findings    Clinical impression: normal ECG

## 2022-12-05 NOTE — PATIENT INSTRUCTIONS
Medicare Wellness  Personal Prevention Plan of Service     Date of Office Visit:    Encounter Provider:  Jori Reinoso MD  Place of Service:  [facility]  Patient Name: Mariana Proctor  :  1955    As part of the Medicare Wellness portion of your visit today, we are providing you with this personalized preventive plan of services (PPPS). This plan is based upon recommendations of the [place] Preventive Services Task Force (USPSTF) and the Advisory Committee on Immunization Practices (ACIP).    This lists the preventive care services that should be considered, and provides dates of when you are due. Items listed as completed are up-to-date and do not require any further intervention.    Health Maintenance   Topic Date Due    DXA SCAN  Never done    COLORECTAL CANCER SCREENING  2018    PAP SMEAR  2020    Pneumococcal Vaccine 65+ (1 - PCV) Never done    COVID-19 Vaccine (3 - Booster for Pfizer series) 2022 (Originally 6/3/2021)    ZOSTER VACCINE (2 of 2) 2022 (Originally 2017)    INFLUENZA VACCINE  2023 (Originally 2022)    LIPID PANEL  2023    MAMMOGRAM  2023    ANNUAL WELLNESS VISIT  2023    TDAP/TD VACCINES (3 - Td or Tdap) 2029    HEPATITIS C SCREENING  Completed       No orders of the defined types were placed in this encounter.      Return in about 1 year (around 2023) for Annual physical.

## 2022-12-07 LAB
APPEARANCE UR: CLEAR
BACTERIA #/AREA URNS HPF: NORMAL /HPF
BILIRUB UR QL STRIP: NEGATIVE
CASTS URNS QL MICRO: NORMAL /LPF
COLOR UR: YELLOW
EPI CELLS #/AREA URNS HPF: NORMAL /HPF (ref 0–10)
GLUCOSE UR QL STRIP: NEGATIVE
HBA1C MFR BLD: 5.7 % (ref 4.8–5.6)
HGB UR QL STRIP: ABNORMAL
KETONES UR QL STRIP: NEGATIVE
LEUKOCYTE ESTERASE UR QL STRIP: NEGATIVE
MICRO URNS: ABNORMAL
NITRITE UR QL STRIP: NEGATIVE
PH UR STRIP: 6.5 [PH] (ref 5–7.5)
PROT UR QL STRIP: NEGATIVE
RBC #/AREA URNS HPF: NORMAL /HPF (ref 0–2)
SP GR UR STRIP: 1.01 (ref 1–1.03)
T3 SERPL-MCNC: 88.6 NG/DL (ref 80–200)
T4 FREE SERPL-MCNC: 1.48 NG/DL (ref 0.93–1.7)
TSH SERPL DL<=0.005 MIU/L-ACNC: 0.53 UIU/ML (ref 0.27–4.2)
URINALYSIS REFLEX: ABNORMAL
UROBILINOGEN UR STRIP-MCNC: 0.2 MG/DL (ref 0.2–1)
WBC #/AREA URNS HPF: NORMAL /HPF (ref 0–5)

## 2023-01-10 ENCOUNTER — TELEPHONE (OUTPATIENT)
Dept: FAMILY MEDICINE CLINIC | Facility: CLINIC | Age: 68
End: 2023-01-10
Payer: MEDICARE

## 2023-01-10 DIAGNOSIS — E03.9 ADULT HYPOTHYROIDISM: Chronic | ICD-10-CM

## 2023-01-10 RX ORDER — LEVOTHYROXINE SODIUM 88 UG/1
88 TABLET ORAL DAILY
Qty: 90 TABLET | Refills: 1 | Status: SHIPPED | OUTPATIENT
Start: 2023-01-10 | End: 2023-01-10 | Stop reason: SDUPTHER

## 2023-01-10 RX ORDER — LEVOTHYROXINE SODIUM 88 UG/1
88 TABLET ORAL DAILY
Qty: 90 TABLET | Refills: 1 | Status: SHIPPED | OUTPATIENT
Start: 2023-01-10

## 2023-01-10 RX ORDER — LEVOTHYROXINE SODIUM 88 UG/1
88 TABLET ORAL DAILY
Qty: 14 TABLET | Refills: 0 | Status: SHIPPED | OUTPATIENT
Start: 2023-01-10

## 2023-01-10 NOTE — TELEPHONE ENCOUNTER
Caller: Car Proctor    Relationship: Emergency Contact    Best call back number: [phone]    Requested Prescriptions:   Requested Prescriptions     Pending Prescriptions Disp Refills   • levothyroxine (SYNTHROID, LEVOTHROID) 88 MCG tablet 90 tablet 3     Sig: Take 1 tablet by mouth Daily. Give Name Brand Synthroid only        Pharmacy where request should be sent: [facility]/PHARMACY #[id number] [place], [place] - [address] [address] [facility]. AT [facility] [phone] [facility] [phone] FX     Additional details provided by patient: PATIENT IS IN NEED OF A TWO WEEKS SUPPLY UNTIL THE MAIL ORDER PHARMACY CAN GET HER MEDICATION TO HER.  Does the patient have less than a 3 day supply:  [x] Yes  [] No    Would you like a call back once the refill request has been completed: [x] Yes [] No    If the office needs to give you a call back, can they leave a voicemail: [x] Yes [] No    Ayala Ramirez Rep   01/10/23 14:03 EST

## 2023-01-10 NOTE — TELEPHONE ENCOUNTER
Caller: Car Proctor    Relationship: Emergency Contact    Best call back number: [phone]    Requested Prescriptions:   Requested Prescriptions     Pending Prescriptions Disp Refills   • levothyroxine (SYNTHROID, LEVOTHROID) 88 MCG tablet 90 tablet 3     Sig: Take 1 tablet by mouth Daily. Give Name Brand Synthroid only        Pharmacy where request should be sent: [facility] - [address] [phone] [facility] [phone]      Additional details provided by patient: PATIENT HAS THREE TABLETS LEFT.      Does the patient have less than a 3 day supply:  [x] Yes  [] No    Would you like a call back once the refill request has been completed: [x] Yes [] No    If the office needs to give you a call back, can they leave a voicemail: [x] Yes [] No    Ayala Ramirez Rep   01/10/23 13:59 EST

## 2023-04-17 ENCOUNTER — PRE-PROCEDURE SCREENING (OUTPATIENT)
Dept: GASTROENTEROLOGY | Facility: CLINIC | Age: 68
End: 2023-04-17
Payer: MEDICARE

## 2023-04-22 NOTE — PROGRESS NOTES
"Subjective   Mariana Proctor is a 67 y.o. female.     History of Present Illness     Chief Complaint:   Chief Complaint   Patient presents with   • Hypothyroidism       Mariana Proctor 67 y.o. female who presents today for Medical Management of the below listed issues. She  has a problem list of   Patient Active Problem List   Diagnosis   • Allergic rhinitis   • Anxiety   • HLD (hyperlipidemia)   • Adult hypothyroidism   • Cannot sleep   • Adaptive colitis   • Lung mass   • Awareness of heartbeats   • Borderline diabetes   • B12 deficiency   • Avitaminosis D   • Tension headache   • Upper back pain on left side   • IFG (impaired fasting glucose)   .  Since the last visit, She has overall felt well overall, but her lower back pain issue is persistent.   she has been compliant with   Current Outpatient Medications:   •  levothyroxine (SYNTHROID, LEVOTHROID) 88 MCG tablet, Take 1 tablet by mouth Daily. Give Name Brand Synthroid only, Disp: 14 tablet, Rfl: 3  •  baclofen (LIORESAL) 10 MG tablet, Take 1 tablet by mouth Every Night., Disp: 30 tablet, Rfl: 1  •  celecoxib (CeleBREX) 200 MG capsule, Take 1 capsule by mouth Daily., Disp: 30 capsule, Rfl: 1  •  vitamin D (ERGOCALCIFEROL) 79601 units capsule capsule, Take 1 capsule by mouth 1 (One) Time Per Week., Disp: 8 capsule, Rfl: 1.  She denies medication side effects.    All of the other chronic condition(s) listed above are stable w/o issues.    /86   Pulse 80   Temp 97.5 °F (36.4 °C) (Oral)   Resp 16   Ht 152.4 cm (60\")   Wt 57.6 kg (127 lb)   BMI 24.80 kg/m²     Results for orders placed or performed in visit on 12/05/22   Hemoglobin A1c    Specimen: Blood   Result Value Ref Range    Hemoglobin A1C 5.70 (H) 4.80 - 5.60 %   TSH    Specimen: Blood   Result Value Ref Range    TSH 0.532 0.270 - 4.200 uIU/mL   T4, Free    Specimen: Blood   Result Value Ref Range    Free T4 1.48 0.93 - 1.70 ng/dL   T3    Specimen: Blood   Result Value Ref Range    T3, Total 88.6 " 80.0 - 200.0 ng/dl   Urinalysis With Culture If Indicated -   Result Value Ref Range    Specific Gravity, UA 1.010 1.005 - 1.030    pH, UA 6.5 5.0 - 7.5    Color, UA Yellow Yellow    Appearance, UA Clear Clear    Leukocytes, UA Negative Negative    Protein Negative Negative/Trace    Glucose, UA Negative Negative    Ketones Negative Negative    Blood, UA Trace (A) Negative    Bilirubin, UA Negative Negative    Urobilinogen, UA 0.2 0.2 - 1.0 mg/dL    Nitrite, UA Negative Negative    Microscopic Examination See below:     Urinalysis Reflex Comment    Microscopic Examination -   Result Value Ref Range    WBC, UA None seen 0 - 5 /hpf    RBC, UA None seen 0 - 2 /hpf    Epithelial Cells (non renal) None seen 0 - 10 /hpf    Casts None seen None seen /lpf    Bacteria, UA None seen None seen/Few /hpf             The following portions of the patient's history were reviewed and updated as appropriate: allergies, current medications, past family history, past medical history, past social history, past surgical history, and problem list.    Review of Systems   Constitutional: Negative for activity change, chills and fever.   Respiratory: Negative for cough.    Cardiovascular: Negative for chest pain.   Musculoskeletal: Positive for back pain.   Psychiatric/Behavioral: Negative for dysphoric mood.       Objective   Physical Exam  Constitutional:       General: She is not in acute distress.     Appearance: She is well-developed.   Cardiovascular:      Rate and Rhythm: Normal rate and regular rhythm.   Pulmonary:      Effort: Pulmonary effort is normal.      Breath sounds: Normal breath sounds.   Neurological:      Mental Status: She is alert and oriented to person, place, and time.   Psychiatric:         Behavior: Behavior normal.         Thought Content: Thought content normal.             Diagnoses and all orders for this visit:    1. Adult hypothyroidism (Primary)  -     Discontinue: levothyroxine (SYNTHROID, LEVOTHROID) 88 MCG  tablet; Take 1 tablet by mouth Daily. Give Name Brand Synthroid only  Dispense: 90 tablet; Refill: 3  -     T4, Free  -     TSH  -     T3  -     levothyroxine (SYNTHROID, LEVOTHROID) 88 MCG tablet; Take 1 tablet by mouth Daily. Give Name Brand Synthroid only  Dispense: 14 tablet; Refill: 3    2. Right-sided low back pain without sciatica, unspecified chronicity  -     Ambulatory Referral to Physical Therapy Evaluate and treat

## 2023-04-24 ENCOUNTER — OFFICE VISIT (OUTPATIENT)
Dept: FAMILY MEDICINE CLINIC | Facility: CLINIC | Age: 68
End: 2023-04-24
Payer: MEDICARE

## 2023-04-24 VITALS
HEART RATE: 80 BPM | WEIGHT: 127 LBS | DIASTOLIC BLOOD PRESSURE: 86 MMHG | TEMPERATURE: 97.5 F | RESPIRATION RATE: 16 BRPM | SYSTOLIC BLOOD PRESSURE: 124 MMHG | HEIGHT: 60 IN | BODY MASS INDEX: 24.94 KG/M2

## 2023-04-24 DIAGNOSIS — M54.50 RIGHT-SIDED LOW BACK PAIN WITHOUT SCIATICA, UNSPECIFIED CHRONICITY: ICD-10-CM

## 2023-04-24 DIAGNOSIS — E03.9 ADULT HYPOTHYROIDISM: Primary | Chronic | ICD-10-CM

## 2023-04-24 PROCEDURE — 99214 OFFICE O/P EST MOD 30 MIN: CPT | Performed by: FAMILY MEDICINE

## 2023-04-24 PROCEDURE — 1160F RVW MEDS BY RX/DR IN RCRD: CPT | Performed by: FAMILY MEDICINE

## 2023-04-24 PROCEDURE — 1159F MED LIST DOCD IN RCRD: CPT | Performed by: FAMILY MEDICINE

## 2023-04-24 RX ORDER — LEVOTHYROXINE SODIUM 88 UG/1
88 TABLET ORAL DAILY
Qty: 90 TABLET | Refills: 3 | Status: SHIPPED | OUTPATIENT
Start: 2023-04-24 | End: 2023-04-24 | Stop reason: SDUPTHER

## 2023-04-24 RX ORDER — LEVOTHYROXINE SODIUM 88 UG/1
88 TABLET ORAL DAILY
Qty: 14 TABLET | Refills: 3 | Status: SHIPPED | OUTPATIENT
Start: 2023-04-24

## 2023-04-25 LAB
T3 SERPL-MCNC: 81.9 NG/DL (ref 80–200)
T4 FREE SERPL-MCNC: 1.64 NG/DL (ref 0.93–1.7)
TSH SERPL DL<=0.005 MIU/L-ACNC: 0.29 UIU/ML (ref 0.27–4.2)

## 2023-10-29 NOTE — PROGRESS NOTES
"  Chief Complaint:   Chief Complaint   Patient presents with    Hypothyroidism     Med refill due   / [facility] / no labs        Mariana Proctor 68 y.o. female who presents today for Medical Management of the below listed issues. She  has a problem list of   Patient Active Problem List   Diagnosis    Allergic rhinitis    Anxiety    HLD (hyperlipidemia)    Adult hypothyroidism    Cannot sleep    Adaptive colitis    Lung mass    Awareness of heartbeats    Borderline diabetes    B12 deficiency    Avitaminosis D    Tension headache    Upper back pain on left side    IFG (impaired fasting glucose)   .  Since the last visit, She has overall felt well.  she has been compliant with   Current Outpatient Medications:     levothyroxine (SYNTHROID, LEVOTHROID) 88 MCG tablet, Take 1 tablet by mouth Daily. Give Name Brand Synthroid only, Disp: 90 tablet, Rfl: 3    baclofen (LIORESAL) 10 MG tablet, Take 1 tablet by mouth Every Night., Disp: 30 tablet, Rfl: 1    celecoxib (CeleBREX) 200 MG capsule, Take 1 capsule by mouth Daily., Disp: 30 capsule, Rfl: 1    vitamin D (ERGOCALCIFEROL) 38255 units capsule capsule, Take 1 capsule by mouth 1 (One) Time Per Week., Disp: 8 capsule, Rfl: 1.  She denies medication side effects.    All of the other chronic condition(s) listed above are stable w/o issues.    /72 Comment: SO BP DONE  Pulse 56   Temp 97.4 °F (36.3 °C) (Oral)   Resp 14   Ht 152.4 cm (60\")   Wt 57.6 kg (127 lb)   SpO2 98%   BMI 24.80 kg/m²     Results for orders placed or performed in visit on 04/24/23   T4, Free    Specimen: Blood   Result Value Ref Range    Free T4 1.64 0.93 - 1.70 ng/dL   TSH    Specimen: Blood   Result Value Ref Range    TSH 0.290 0.270 - 4.200 uIU/mL   T3    Specimen: Blood   Result Value Ref Range    T3, Total 81.9 80.0 - 200.0 ng/dl             The following portions of the patient's history were reviewed and updated as appropriate: allergies, current medications, past family " history, past medical history, past social history, past surgical history, and problem list.    Review of Systems   Constitutional:  Positive for fatigue (over time). Negative for activity change, chills and fever.   Respiratory:  Negative for cough.    Cardiovascular:  Negative for chest pain.   Psychiatric/Behavioral:  Negative for dysphoric mood.        Objective     BMI is within normal parameters. No other follow-up for BMI required.       Physical Exam  Constitutional:       General: She is not in acute distress.     Appearance: She is well-developed.   Cardiovascular:      Rate and Rhythm: Normal rate.   Pulmonary:      Effort: Pulmonary effort is normal.      Breath sounds: Normal breath sounds.   Neurological:      Mental Status: She is alert and oriented to person, place, and time.   Psychiatric:         Behavior: Behavior normal.         Thought Content: Thought content normal.             Diagnoses and all orders for this visit:    1. Adult hypothyroidism (Primary)  -     levothyroxine (SYNTHROID, LEVOTHROID) 88 MCG tablet; Take 1 tablet by mouth Daily. Give Name Brand Synthroid only  Dispense: 90 tablet; Refill: 3  -     T3  -     T4, Free  -     TSH    2. IFG (impaired fasting glucose)  -     Hemoglobin A1c  -     Comprehensive Metabolic Panel    3. Screening for colon cancer  -     Ambulatory Referral to Gastroenterology    4. Other fatigue  -     Comprehensive Metabolic Panel  -     CBC & Differential    Diet/exercise/weight management to treat the glucose discussed.

## 2023-10-30 ENCOUNTER — OFFICE VISIT (OUTPATIENT)
Dept: FAMILY MEDICINE CLINIC | Facility: CLINIC | Age: 68
End: 2023-10-30
Payer: MEDICARE

## 2023-10-30 VITALS
DIASTOLIC BLOOD PRESSURE: 72 MMHG | SYSTOLIC BLOOD PRESSURE: 133 MMHG | OXYGEN SATURATION: 98 % | TEMPERATURE: 97.4 F | WEIGHT: 127 LBS | BODY MASS INDEX: 24.94 KG/M2 | RESPIRATION RATE: 14 BRPM | HEIGHT: 60 IN | HEART RATE: 56 BPM

## 2023-10-30 DIAGNOSIS — R53.83 OTHER FATIGUE: ICD-10-CM

## 2023-10-30 DIAGNOSIS — R73.01 IFG (IMPAIRED FASTING GLUCOSE): ICD-10-CM

## 2023-10-30 DIAGNOSIS — Z12.11 SCREENING FOR COLON CANCER: ICD-10-CM

## 2023-10-30 DIAGNOSIS — E03.9 ADULT HYPOTHYROIDISM: Primary | Chronic | ICD-10-CM

## 2023-10-30 RX ORDER — LEVOTHYROXINE SODIUM 88 UG/1
88 TABLET ORAL DAILY
Qty: 90 TABLET | Refills: 3 | Status: SHIPPED | OUTPATIENT
Start: 2023-10-30

## 2023-10-31 LAB
ALBUMIN SERPL-MCNC: 4.8 G/DL (ref 3.5–5.2)
ALBUMIN/GLOB SERPL: 2.7 G/DL
ALP SERPL-CCNC: 59 U/L (ref 39–117)
ALT SERPL-CCNC: 26 U/L (ref 1–33)
AST SERPL-CCNC: 25 U/L (ref 1–32)
BASOPHILS # BLD AUTO: 0.04 10*3/MM3 (ref 0–0.2)
BASOPHILS NFR BLD AUTO: 0.7 % (ref 0–1.5)
BILIRUB SERPL-MCNC: 0.6 MG/DL (ref 0–1.2)
BUN SERPL-MCNC: 10 MG/DL (ref 8–23)
BUN/CREAT SERPL: 14.3 (ref 7–25)
CALCIUM SERPL-MCNC: 9.9 MG/DL (ref 8.6–10.5)
CHLORIDE SERPL-SCNC: 105 MMOL/L (ref 98–107)
CO2 SERPL-SCNC: 31.4 MMOL/L (ref 22–29)
CREAT SERPL-MCNC: 0.7 MG/DL (ref 0.57–1)
EGFRCR SERPLBLD CKD-EPI 2021: 95.5 ML/MIN/1.73
EOSINOPHIL # BLD AUTO: 0.07 10*3/MM3 (ref 0–0.4)
EOSINOPHIL NFR BLD AUTO: 1.2 % (ref 0.3–6.2)
ERYTHROCYTE [DISTWIDTH] IN BLOOD BY AUTOMATED COUNT: 11.5 % (ref 12.3–15.4)
GLOBULIN SER CALC-MCNC: 1.8 GM/DL
GLUCOSE SERPL-MCNC: 113 MG/DL (ref 65–99)
HBA1C MFR BLD: 5.7 % (ref 4.8–5.6)
HCT VFR BLD AUTO: 38.1 % (ref 34–46.6)
HGB BLD-MCNC: 12.9 G/DL (ref 12–15.9)
IMM GRANULOCYTES # BLD AUTO: 0.01 10*3/MM3 (ref 0–0.05)
IMM GRANULOCYTES NFR BLD AUTO: 0.2 % (ref 0–0.5)
LYMPHOCYTES # BLD AUTO: 1.62 10*3/MM3 (ref 0.7–3.1)
LYMPHOCYTES NFR BLD AUTO: 28.4 % (ref 19.6–45.3)
MCH RBC QN AUTO: 32.1 PG (ref 26.6–33)
MCHC RBC AUTO-ENTMCNC: 33.9 G/DL (ref 31.5–35.7)
MCV RBC AUTO: 94.8 FL (ref 79–97)
MONOCYTES # BLD AUTO: 0.39 10*3/MM3 (ref 0.1–0.9)
MONOCYTES NFR BLD AUTO: 6.8 % (ref 5–12)
NEUTROPHILS # BLD AUTO: 3.58 10*3/MM3 (ref 1.7–7)
NEUTROPHILS NFR BLD AUTO: 62.7 % (ref 42.7–76)
NRBC BLD AUTO-RTO: 0 /100 WBC (ref 0–0.2)
PLATELET # BLD AUTO: 233 10*3/MM3 (ref 140–450)
POTASSIUM SERPL-SCNC: 4.4 MMOL/L (ref 3.5–5.2)
PROT SERPL-MCNC: 6.6 G/DL (ref 6–8.5)
RBC # BLD AUTO: 4.02 10*6/MM3 (ref 3.77–5.28)
SODIUM SERPL-SCNC: 142 MMOL/L (ref 136–145)
T3 SERPL-MCNC: 78.4 NG/DL (ref 80–200)
T4 FREE SERPL-MCNC: 1.53 NG/DL (ref 0.93–1.7)
TSH SERPL DL<=0.005 MIU/L-ACNC: 0.48 UIU/ML (ref 0.27–4.2)
WBC # BLD AUTO: 5.71 10*3/MM3 (ref 3.4–10.8)

## 2024-04-17 ENCOUNTER — PREP FOR SURGERY (OUTPATIENT)
Dept: SURGERY | Facility: SURGERY CENTER | Age: 69
End: 2024-04-17
Payer: MEDICARE

## 2024-04-17 ENCOUNTER — OFFICE VISIT (OUTPATIENT)
Dept: GASTROENTEROLOGY | Facility: CLINIC | Age: 69
End: 2024-04-17
Payer: MEDICARE

## 2024-04-17 VITALS
WEIGHT: 130.6 LBS | HEIGHT: 60 IN | DIASTOLIC BLOOD PRESSURE: 76 MMHG | HEART RATE: 70 BPM | TEMPERATURE: 97.2 F | BODY MASS INDEX: 25.64 KG/M2 | OXYGEN SATURATION: 96 % | SYSTOLIC BLOOD PRESSURE: 122 MMHG

## 2024-04-17 DIAGNOSIS — R19.5 CHANGE IN STOOL CALIBER: ICD-10-CM

## 2024-04-17 DIAGNOSIS — Z12.11 ENCOUNTER FOR SCREENING COLONOSCOPY: ICD-10-CM

## 2024-04-17 DIAGNOSIS — K21.00 GASTROESOPHAGEAL REFLUX DISEASE WITH ESOPHAGITIS WITHOUT HEMORRHAGE: ICD-10-CM

## 2024-04-17 DIAGNOSIS — R10.13 EPIGASTRIC PAIN: ICD-10-CM

## 2024-04-17 DIAGNOSIS — R12 WATERBRASH: ICD-10-CM

## 2024-04-17 DIAGNOSIS — K31.A0 GASTRIC INTESTINAL METAPLASIA: Primary | ICD-10-CM

## 2024-04-17 RX ORDER — PANTOPRAZOLE SODIUM 40 MG/1
40 TABLET, DELAYED RELEASE ORAL DAILY
Qty: 90 TABLET | Refills: 3 | Status: SHIPPED | OUTPATIENT
Start: 2024-04-17

## 2024-04-17 RX ORDER — SODIUM CHLORIDE 0.9 % (FLUSH) 0.9 %
10 SYRINGE (ML) INJECTION AS NEEDED
OUTPATIENT
Start: 2024-04-17

## 2024-04-17 RX ORDER — SODIUM CHLORIDE, SODIUM LACTATE, POTASSIUM CHLORIDE, CALCIUM CHLORIDE 600; 310; 30; 20 MG/100ML; MG/100ML; MG/100ML; MG/100ML
30 INJECTION, SOLUTION INTRAVENOUS CONTINUOUS PRN
OUTPATIENT
Start: 2024-04-17

## 2024-04-17 RX ORDER — SODIUM CHLORIDE 0.9 % (FLUSH) 0.9 %
3 SYRINGE (ML) INJECTION EVERY 12 HOURS SCHEDULED
OUTPATIENT
Start: 2024-04-17

## 2024-04-17 NOTE — PATIENT INSTRUCTIONS
Schedule EGD and colonoscopy, orders placed     For GERD:    Follow antireflux precautions:    Begin taking pantoprazole 40 mg once daily prior to breakfast - prescription sent to your [facility] pharmacy   Avoiding eating within 3 to 4 hours of bedtime.    Avoid foods that can trigger symptoms which may include:  citrus fruits  spicy foods,  Tomatoes  Red sauces   Chocolate  coffee/tea  caffeinated or carbonated beverages  alcohol      We recommend starting a daily fiber supplement such as Metamucil, Benefiber, Citrucel       As well as consuming at least 20 to 30 g of dietary fiber per day  This helps to keep stool soft and formed and easy transit throughout the colon to produce regular and complete bowel movements.  When starting fiber regimen recommend starting with a low-dose and gradually increasing by 1 tablespoon every 7 days as tolerated.  This will help your body acclimate to the higher fiber diet and reduce risk of unwanted side effects that include bloating, gas, abdominal fullness, and cramping  For example: Begin taking 1 tablespoon daily for at least 7 days, if this is not successful in producing regular bowel movements can begin taking 1 tablespoons twice a day, and finally if this is not successful after taking 2 tablespoons for 7 days, can further increase to maximum recommended dosing of 1 tablespoon 3 times per day.  It is important to consume increased amounts of fluid throughout the day to help improve the effectiveness of the fiber supplementation  Avoid gummy formulations of fiber as this can further increase your risk of the above adverse effects due to artificial sweeteners in the Gummies.  Fiber supplements can take 12 to 72 hours to start working. Make sure to drink 6 to 12 ounces of water or noncarbonated beverage with fiber supplement.     If not successful in improving bowel habits can begin taking     For constipation:    We recommend starting MiraLAX. This is available over-the-counter and  generic brand is fine.    MiraLAX works best when taken on a regular basis (daily or every other day) to help promote more regular bowel movements.    Typically patients start with 1/2 -1 capful mixed in 8 ounces of noncarbonated liquid and take once or twice daily.    Maximum is 2 full capfuls daily.  Adjust dose based on your response.

## 2024-04-17 NOTE — PROGRESS NOTES
Chief Complaint   Patient presents with    Heartburn           History of Present Illness    Patient is a 68 y.o. who presents to the office as a new patient for further evaluation of GERD and recommendations for future colon cancer screening.  Patient has a significant past medical history of GERD, intestinal metaplasia on 2019 EGD, IBS, hypothyroidism.    Previous EGD and colonoscopy performed on 4/26/2019 with Dr. Schaffer remarkable for:    Erosive gastritis  Tissue sampling of gastric antrum revealed complete intestinal metaplasia involving 50% of the mucosa, and reactive gastropathy without evidence of H. pylori.  Esophagitis    Colonoscopy:    Endoscopically normal-appearing colon  Grade 1 internal hemorrhoids    Patient reports gradual worsening of upper abdominal pain as well as waterbrash sensation and belching over the past several months with intermittent nausea without vomiting or known specific dietary trigger.  States that since onset she has intermittently taken famotidine however denies regularly taking medication.    Denies use of NSAIDs, alcohol, or tobacco.    Bowel habits are described as primarily constipation with sensation of incomplete evacuation of stool despite increasing dietary intake of fiber.  Denies melena or hematochezia.      States that she does intermittently experience a rectal bulge with defecation as well as palpable rectal nodule that is not present at time of today's visit.  This is also associated with decreased stool caliber as well as intermittent lower abdominal cramping that improves with defecation.  Denies painful in nature.    She has struggled with constipation throughout her lifetime and has been diagnosed with hypothyroidism that is followed by her primary care provider.    Denies unintentional weight loss.    Patient denies known family history of colon polyps, colon cancer, or IBD.       Result Review :             Vital Signs:   /76   Pulse 70   Temp 97.2  "°F (36.2 °C)   Ht 152.4 cm (60\")   Wt 59.2 kg (130 lb 9.6 oz)   SpO2 96%   BMI 25.51 kg/m²     Body mass index is 25.51 kg/m².     Physical Exam  Vitals reviewed.   Constitutional:       General: She is not in acute distress.     Appearance: Normal appearance. She is not ill-appearing.   Eyes:      General: No scleral icterus.  Pulmonary:      Effort: Pulmonary effort is normal. No respiratory distress.   Abdominal:      General: Bowel sounds are normal. There is no distension.      Palpations: Abdomen is soft. Abdomen is not rigid. There is no mass or pulsatile mass.      Tenderness: There is no abdominal tenderness. There is no guarding or rebound.      Hernia: No hernia is present.   Skin:     Coloration: Skin is not jaundiced.   Neurological:      Mental Status: She is alert and oriented to person, place, and time.   Psychiatric:         Thought Content: Thought content normal.         Judgment: Judgment normal.           Assessment and Plan    Diagnoses and all orders for this visit:    1. Gastric intestinal metaplasia (Primary)  -     pantoprazole (PROTONIX) 40 MG EC tablet; Take 1 tablet by mouth Daily.  Dispense: 90 tablet; Refill: 3    2. Gastroesophageal reflux disease with esophagitis without hemorrhage  -     pantoprazole (PROTONIX) 40 MG EC tablet; Take 1 tablet by mouth Daily.  Dispense: 90 tablet; Refill: 3    3. Waterbrash    4. Epigastric pain           Discussion:    Patient is a pleasant 68-year-old female who presents today for further evaluation of refractory heartburn symptoms, recommendations for future EGD, and further evaluation of stool caliber changes and intermittent rectal nodule.    Discussed recommendations to proceed with EGD evaluation for surveillance of previous gastrointestinal metaplasia as well as for further evaluation of refractory heartburn symptoms.  Due to symptomatic state will start patient on pantoprazole 40 mg once daily especially in the setting of known intestinal " metaplasia.    We will also proceed with colonoscopy evaluation at time of EGD to further assess rectal nodules as well as to assess for evidence of an underlying cause to stool caliber changes.    I have encouraged her to start daily fiber supplementation to reduce anorectal irritation as well as improve stool consistency.  If at any point rectal nodule returns or changes in characteristics she will contact her office for further assessment as she is currently asymptomatic at time of today's visit.    Patient is agreeable to the outlined above treatment plan.  Verbalizes understanding and will contact office for any new or worsening concerns.  All questions answered and support provided.        Patient Instructions   Schedule EGD and colonoscopy, orders placed     For GERD:    Follow antireflux precautions:    Begin taking pantoprazole 40 mg once daily prior to breakfast - prescription sent to your [facility] pharmacy   Avoiding eating within 3 to 4 hours of bedtime.    Avoid foods that can trigger symptoms which may include:  citrus fruits  spicy foods,  Tomatoes  Red sauces   Chocolate  coffee/tea  caffeinated or carbonated beverages  alcohol      We recommend starting a daily fiber supplement such as Metamucil, Benefiber, Citrucel       As well as consuming at least 20 to 30 g of dietary fiber per day  This helps to keep stool soft and formed and easy transit throughout the colon to produce regular and complete bowel movements.  When starting fiber regimen recommend starting with a low-dose and gradually increasing by 1 tablespoon every 7 days as tolerated.  This will help your body acclimate to the higher fiber diet and reduce risk of unwanted side effects that include bloating, gas, abdominal fullness, and cramping  For example: Begin taking 1 tablespoon daily for at least 7 days, if this is not successful in producing regular bowel movements can begin taking 1 tablespoons twice a day, and finally if this is not  successful after taking 2 tablespoons for 7 days, can further increase to maximum recommended dosing of 1 tablespoon 3 times per day.  It is important to consume increased amounts of fluid throughout the day to help improve the effectiveness of the fiber supplementation  Avoid gummy formulations of fiber as this can further increase your risk of the above adverse effects due to artificial sweeteners in the Gummies.  Fiber supplements can take 12 to 72 hours to start working. Make sure to drink 6 to 12 ounces of water or noncarbonated beverage with fiber supplement.     If not successful in improving bowel habits can begin taking     For constipation:    We recommend starting MiraLAX. This is available over-the-counter and generic brand is fine.    MiraLAX works best when taken on a regular basis (daily or every other day) to help promote more regular bowel movements.    Typically patients start with 1/2 -1 capful mixed in 8 ounces of noncarbonated liquid and take once or twice daily.    Maximum is 2 full capfuls daily.  Adjust dose based on your response.              EMR Dragon/Transcription Disclaimer:  This document has been Dictated utilizing Dragon dictation.

## 2024-06-04 NOTE — PROGRESS NOTES
The ABCs of the Annual Wellness Visit  Initial Medicare Wellness Visit    Subjective     Mariana Proctor is a 68 y.o. female who presents for an Initial Medicare Wellness Visit.    The following portions of the patient's history were reviewed and   updated as appropriate: allergies, current medications, past family history, past medical history, past social history, past surgical history, and problem list.     Compared to one year ago, the patient feels her physical   health is the same.    Compared to one year ago, the patient feels her mental   health is the same.    Recent Hospitalizations:  She was not admitted to the hospital during the last year.       Current Medical Providers:  Patient Care Team:  Jori Reinoso MD as PCP - General (Family Medicine)  Donna Rosas APRN as Nurse Practitioner (Gastroenterology)  Jt Gerber MD as Consulting Physician (Gastroenterology)    Outpatient Medications Prior to Visit   Medication Sig Dispense Refill    pantoprazole (PROTONIX) 40 MG EC tablet Take 1 tablet by mouth Daily. (Patient not taking: Reported on 6/5/2024) 90 tablet 3    levothyroxine (SYNTHROID, LEVOTHROID) 88 MCG tablet Take 1 tablet by mouth Daily. Give Name Brand Synthroid only 90 tablet 3     No facility-administered medications prior to visit.       No opioid medication identified on active medication list. I have reviewed chart for other potential  high risk medication/s and harmful drug interactions in the elderly.        Aspirin is not on active medication list.  Aspirin use is not indicated based on review of current medical condition/s. Risk of harm outweighs potential benefits.  .    Patient Active Problem List   Diagnosis    Allergic rhinitis    Anxiety    HLD (hyperlipidemia)    Adult hypothyroidism    Cannot sleep    Adaptive colitis    Lung mass    Awareness of heartbeats    Borderline diabetes    B12 deficiency    Avitaminosis D    Tension headache    Upper back pain on left side     "IFG (impaired fasting glucose)    Gastroesophageal reflux disease with esophagitis without hemorrhage    Gastric intestinal metaplasia    Waterbrash    Epigastric pain    Encounter for screening colonoscopy    Change in stool caliber     Advance Care Planning   Advance Care Planning     Advance Directive is not on file.  ACP discussion was held with the patient during this visit. Patient does not have an advance directive, information provided.       Objective    Vitals:    24 0938   BP: 138/79   Pulse: 64   Resp: 14   Temp: 97.5 °F (36.4 °C)   TempSrc: Oral   Weight: 59.4 kg (131 lb)   Height: 152.4 cm (60\")   PainSc: 0-No pain     Estimated body mass index is 25.58 kg/m² as calculated from the following:    Height as of this encounter: 152.4 cm (60\").    Weight as of this encounter: 59.4 kg (131 lb).    BMI is >= 25 and <30. (Overweight) The following options were offered after discussion;: exercise counseling/recommendations and nutrition counseling/recommendations      Does the patient have evidence of cognitive impairment?   No          HEALTH RISK ASSESSMENT    Smoking Status:  Social History     Tobacco Use   Smoking Status Never   Smokeless Tobacco Never     Alcohol Consumption:  Social History     Substance and Sexual Activity   Alcohol Use No     Fall Risk Screen:    GISELE Fall Risk Assessment has not been completed.    Depression Screen:       2024     9:38 AM   PHQ-2/PHQ-9 Depression Screening   Little Interest or Pleasure in Doing Things 0-->not at all   Feeling Down, Depressed or Hopeless 0-->not at all   PHQ-9: Brief Depression Severity Measure Score 0       Health Habits and Functional and Cognitive Screenin/5/2024     9:00 AM   Functional & Cognitive Status   Do you have difficulty preparing food and eating? No   Do you have difficulty bathing yourself, getting dressed or grooming yourself? No   Do you have difficulty using the toilet? No   Do you have difficulty moving around " from place to place? No   Do you have trouble with steps or getting out of a bed or a chair? No   Current Diet Well Balanced Diet   Dental Exam Up to date   Eye Exam Up to date   Exercise (times per week) 4 times per week   Current Exercises Include Walking   Do you need help using the phone?  No   Are you deaf or do you have serious difficulty hearing?  No   Do you need help to go to places out of walking distance? No   Do you need help shopping? No   Do you need help preparing meals?  No   Do you need help with housework?  No   Do you need help with laundry? No   Do you need help taking your medications? No   Do you need help managing money? No   Do you ever drive or ride in a car without wearing a seat belt? No   Have you felt unusual stress, anger or loneliness in the last month? No   Who do you live with? Spouse   If you need help, do you have trouble finding someone available to you? No   Have you been bothered in the last four weeks by sexual problems? No   Do you have difficulty concentrating, remembering or making decisions? No       Age-appropriate Screening Schedule:  Refer to the list below for future screening recommendations based on patient's age, sex and/or medical conditions. Orders for these recommended tests are listed in the plan section. The patient has been provided with a written plan.    Health Maintenance   Topic Date Due    LIPID PANEL  07/06/2023    COLORECTAL CANCER SCREENING  04/26/2024    PAP SMEAR  07/28/2024 (Originally 2/22/2020)    Pneumococcal Vaccine 65+ (1 of 1 - PCV) 08/09/2024 (Originally 6/19/2022)    COVID-19 Vaccine (5 - 2023-24 season) 08/25/2024 (Originally 9/1/2023)    ZOSTER VACCINE (2 of 2) 10/30/2024 (Originally 11/1/2017)    RSV Vaccine - Adults (1 - 1-dose 60+ series) 06/05/2025 (Originally 6/19/2017)    INFLUENZA VACCINE  08/01/2024    ANNUAL WELLNESS VISIT  06/05/2025    BMI FOLLOWUP  06/05/2025    MAMMOGRAM  09/08/2025    DXA SCAN  09/08/2025    TDAP/TD VACCINES  (4 - Td or Tdap) 06/04/2029    HEPATITIS C SCREENING  Completed          CMS Preventative Services Quick Reference  Risk Factors Identified During Encounter    None Identified    The above risks/problems have been discussed with the patient.  Pertinent information has been shared with the patient in the After Visit Summary.  An After Visit Summary and PPPS were made available to the patient.  Diagnoses and all orders for this visit:    1. Encounter for initial annual wellness visit (AWV) in Medicare patient (Primary)    2. Adult hypothyroidism  -     T3  -     T4, Free  -     TSH  -     levothyroxine (SYNTHROID, LEVOTHROID) 88 MCG tablet; Take 1 tablet by mouth Daily. Give Name Brand Synthroid only  Dispense: 90 tablet; Refill: 3    3. IFG (impaired fasting glucose)  -     Basic Metabolic Panel  -     Hemoglobin A1c      Follow Up:  Next Medicare Wellness visit to be scheduled in 1 year.        Additional E&M Note during same encounter follows:  Patient has multiple medical problems which are significant and separately identifiable that require additional work above and beyond the Medicare Wellness Visit.      Chief Complaint  Hypothyroidism (No labs /[facility] script pharm ) and medicare wellness due    Subjective        Hypothyroidism  Pertinent negatives include no abdominal pain, chest pain, chills, congestion, coughing, fever or rash.     Mariana Proctor is also being seen today for medication management.    Pt doing well on the medication(s) w/o SEs, and is due refill today.      Review of Systems   Constitutional:  Negative for chills and fever.   HENT:  Negative for congestion and sinus pressure.    Eyes:  Negative for visual disturbance.   Respiratory:  Negative for cough and shortness of breath.    Cardiovascular:  Negative for chest pain.   Gastrointestinal:  Negative for abdominal pain.   Genitourinary:  Negative for dysuria.   Skin:  Negative for rash.   Hematological:  Negative for adenopathy.  "  Psychiatric/Behavioral:  Negative for dysphoric mood.        Objective   Vital Signs:  /79   Pulse 64   Temp 97.5 °F (36.4 °C) (Oral)   Resp 14   Ht 152.4 cm (60\")   Wt 59.4 kg (131 lb)   BMI 25.58 kg/m²     Physical Exam  Vitals and nursing note reviewed.   Constitutional:       General: She is not in acute distress.     Appearance: She is well-developed.   Cardiovascular:      Rate and Rhythm: Normal rate and regular rhythm.   Pulmonary:      Effort: Pulmonary effort is normal.      Breath sounds: Normal breath sounds.   Neurological:      Mental Status: She is alert and oriented to person, place, and time.   Psychiatric:         Behavior: Behavior normal.         Thought Content: Thought content normal.          The following data was reviewed by: Jori Reinoso MD on 06/05/2024:  Common labs          10/30/2023    11:26   Common Labs   Glucose 113    BUN 10    Creatinine 0.70    Sodium 142    Potassium 4.4    Chloride 105    Calcium 9.9    Total Protein 6.6    Albumin 4.8    Total Bilirubin 0.6    Alkaline Phosphatase 59    AST (SGOT) 25    ALT (SGPT) 26    WBC 5.71    Hemoglobin 12.9    Hematocrit 38.1    Platelets 233    Hemoglobin A1C 5.70                 Assessment and Plan   Diagnoses and all orders for this visit:    1. Encounter for initial annual wellness visit (AWV) in Medicare patient (Primary)    2. Adult hypothyroidism  -     T3  -     T4, Free  -     TSH  -     levothyroxine (SYNTHROID, LEVOTHROID) 88 MCG tablet; Take 1 tablet by mouth Daily. Give Name Brand Synthroid only  Dispense: 90 tablet; Refill: 3    3. IFG (impaired fasting glucose)  -     Basic Metabolic Panel  -     Hemoglobin A1c    Diet/exercise/weight management to treat the glucose discussed.         I spent 15 minutes caring for Mariana on this date of service. This time includes time spent by me in the following activities:preparing for the visit, performing a medically appropriate examination and/or evaluation , " ordering medications, tests, or procedures, and documenting information in the medical record  Follow Up   Return in about 1 year (around 6/5/2025) for Recheck.  Patient was given instructions and counseling regarding her condition or for health maintenance advice. Please see specific information pulled into the AVS if appropriate.

## 2024-06-05 ENCOUNTER — OFFICE VISIT (OUTPATIENT)
Dept: FAMILY MEDICINE CLINIC | Facility: CLINIC | Age: 69
End: 2024-06-05
Payer: MEDICARE

## 2024-06-05 VITALS
DIASTOLIC BLOOD PRESSURE: 79 MMHG | HEIGHT: 60 IN | WEIGHT: 131 LBS | HEART RATE: 64 BPM | TEMPERATURE: 97.5 F | BODY MASS INDEX: 25.72 KG/M2 | RESPIRATION RATE: 14 BRPM | SYSTOLIC BLOOD PRESSURE: 138 MMHG

## 2024-06-05 DIAGNOSIS — R73.01 IFG (IMPAIRED FASTING GLUCOSE): ICD-10-CM

## 2024-06-05 DIAGNOSIS — Z00.00 ENCOUNTER FOR INITIAL ANNUAL WELLNESS VISIT (AWV) IN MEDICARE PATIENT: Primary | ICD-10-CM

## 2024-06-05 DIAGNOSIS — E03.9 ADULT HYPOTHYROIDISM: Chronic | ICD-10-CM

## 2024-06-05 PROCEDURE — G0439 PPPS, SUBSEQ VISIT: HCPCS | Performed by: FAMILY MEDICINE

## 2024-06-05 PROCEDURE — 1159F MED LIST DOCD IN RCRD: CPT | Performed by: FAMILY MEDICINE

## 2024-06-05 PROCEDURE — 1126F AMNT PAIN NOTED NONE PRSNT: CPT | Performed by: FAMILY MEDICINE

## 2024-06-05 PROCEDURE — 99214 OFFICE O/P EST MOD 30 MIN: CPT | Performed by: FAMILY MEDICINE

## 2024-06-05 PROCEDURE — 1170F FXNL STATUS ASSESSED: CPT | Performed by: FAMILY MEDICINE

## 2024-06-05 PROCEDURE — 1160F RVW MEDS BY RX/DR IN RCRD: CPT | Performed by: FAMILY MEDICINE

## 2024-06-05 RX ORDER — LEVOTHYROXINE SODIUM 88 UG/1
88 TABLET ORAL DAILY
Qty: 90 TABLET | Refills: 3 | Status: SHIPPED | OUTPATIENT
Start: 2024-06-05

## 2024-06-05 NOTE — PATIENT INSTRUCTIONS
Medicare Wellness  Personal Prevention Plan of Service     Date of Office Visit:    Encounter Provider:  Jori Reinoso MD  Place of Service:  [facility]  Patient Name: Mariana Proctor  :  1955    As part of the Medicare Wellness portion of your visit today, we are providing you with this personalized preventive plan of services (PPPS). This plan is based upon recommendations of the [place] Preventive Services Task Force (USPSTF) and the Advisory Committee on Immunization Practices (ACIP).    This lists the preventive care services that should be considered, and provides dates of when you are due. Items listed as completed are up-to-date and do not require any further intervention.    Health Maintenance   Topic Date Due    LIPID PANEL  2023    COLORECTAL CANCER SCREENING  2024    PAP SMEAR  2024 (Originally 2020)    Pneumococcal Vaccine 65+ (1 of 1 - PCV) 2024 (Originally 2022)    COVID-19 Vaccine (5 - -24 season) 2024 (Originally 2023)    ZOSTER VACCINE (2 of 2) 10/30/2024 (Originally 2017)    RSV Vaccine - Adults (1 - 1-dose 60+ series) 2025 (Originally 2017)    INFLUENZA VACCINE  2024    ANNUAL WELLNESS VISIT  2025    BMI FOLLOWUP  2025    MAMMOGRAM  2025    DXA SCAN  2025    TDAP/TD VACCINES (4 - Td or Tdap) 2029    HEPATITIS C SCREENING  Completed       Orders Placed This Encounter   Procedures    T3     Order Specific Question:   Release to patient     Answer:   Routine Release [[id number]]    T4, Free     Order Specific Question:   Release to patient     Answer:   Routine Release [[id number]]    TSH     Order Specific Question:   Release to patient     Answer:   Routine Release [[id number]]    Basic Metabolic Panel     Order Specific Question:   Release to patient     Answer:   Routine Release [[id number]]    Hemoglobin A1c     Order Specific Question:   Release to  patient     Answer:   Routine Release [[id number]]       No follow-ups on file.

## 2024-06-06 LAB
BUN SERPL-MCNC: 11 MG/DL (ref 8–23)
BUN/CREAT SERPL: 14.3 (ref 7–25)
CALCIUM SERPL-MCNC: 9.6 MG/DL (ref 8.6–10.5)
CHLORIDE SERPL-SCNC: 105 MMOL/L (ref 98–107)
CO2 SERPL-SCNC: 28.1 MMOL/L (ref 22–29)
CREAT SERPL-MCNC: 0.77 MG/DL (ref 0.57–1)
EGFRCR SERPLBLD CKD-EPI 2021: 85.2 ML/MIN/1.73
GLUCOSE SERPL-MCNC: 108 MG/DL (ref 65–99)
HBA1C MFR BLD: 5.8 % (ref 4.8–5.6)
POTASSIUM SERPL-SCNC: 4.2 MMOL/L (ref 3.5–5.2)
SODIUM SERPL-SCNC: 142 MMOL/L (ref 136–145)
T3 SERPL-MCNC: 70.5 NG/DL (ref 80–200)
T4 FREE SERPL-MCNC: 1.23 NG/DL (ref 0.92–1.68)
TSH SERPL DL<=0.005 MIU/L-ACNC: 1.53 UIU/ML (ref 0.27–4.2)

## 2024-06-07 DIAGNOSIS — E03.9 ADULT HYPOTHYROIDISM: Primary | ICD-10-CM

## 2024-06-07 RX ORDER — LIOTHYRONINE SODIUM 5 UG/1
5 TABLET ORAL DAILY
Qty: 90 TABLET | Refills: 3 | Status: SHIPPED | OUTPATIENT
Start: 2024-06-07

## 2024-06-10 ENCOUNTER — TELEPHONE (OUTPATIENT)
Dept: FAMILY MEDICINE CLINIC | Facility: CLINIC | Age: 69
End: 2024-06-10
Payer: MEDICARE

## 2024-06-10 NOTE — TELEPHONE ENCOUNTER
----- Message from Jori Reinoso sent at 6/7/2024  1:47 PM EDT -----  Please call the patient regarding this abnormal result.  Patient's T3 is low and is actually lower than prior.  Continue current thyroid dose, however I sent in Cytomel 5 mcg to also take once a day.  I ordered some repeat thyroid labs, which are nonfasting, for 6 weeks, and she needs to drop by then and complete that.  On a side note, she still needs to eat a low-carb diet and exercise daily as her blood sugar is still elevated.

## 2024-06-21 ENCOUNTER — TELEPHONE (OUTPATIENT)
Dept: GASTROENTEROLOGY | Facility: CLINIC | Age: 69
End: 2024-06-21

## 2024-06-21 NOTE — TELEPHONE ENCOUNTER
Caller: Mariana Proctor    Relationship: Self    Best call back number: [phone]    What is the best time to reach you: [employer]    Who are you requesting to speak with (clinical staff, provider,  specific staff member):         What was the call regarding: PT CAN'T LOCATE PREP INSTRUCTIONS GIVEN TO HER. PT REQUESTS PREP, TIME OF ARRIVAL, LOCATION, ETC. BE SENT VIA [employer]. PROC IS 06.25.24    Is it okay if the provider responds through [employer]: YES

## 2024-06-25 ENCOUNTER — HOSPITAL ENCOUNTER (OUTPATIENT)
Facility: SURGERY CENTER | Age: 69
Setting detail: HOSPITAL OUTPATIENT SURGERY
Discharge: HOME | End: 2024-06-25
Attending: INTERNAL MEDICINE | Admitting: INTERNAL MEDICINE
Payer: MEDICARE

## 2024-06-25 ENCOUNTER — ANESTHESIA (OUTPATIENT)
Dept: SURGERY | Facility: SURGERY CENTER | Age: 69
End: 2024-06-25
Payer: MEDICARE

## 2024-06-25 ENCOUNTER — ANESTHESIA EVENT (OUTPATIENT)
Dept: SURGERY | Facility: SURGERY CENTER | Age: 69
End: 2024-06-25
Payer: MEDICARE

## 2024-06-25 VITALS
DIASTOLIC BLOOD PRESSURE: 78 MMHG | HEART RATE: 56 BPM | WEIGHT: 127.2 LBS | HEIGHT: 60 IN | SYSTOLIC BLOOD PRESSURE: 129 MMHG | TEMPERATURE: 97.3 F | BODY MASS INDEX: 24.97 KG/M2 | OXYGEN SATURATION: 97 % | RESPIRATION RATE: 16 BRPM

## 2024-06-25 DIAGNOSIS — R12 WATERBRASH: ICD-10-CM

## 2024-06-25 DIAGNOSIS — K21.00 GASTROESOPHAGEAL REFLUX DISEASE WITH ESOPHAGITIS WITHOUT HEMORRHAGE: ICD-10-CM

## 2024-06-25 DIAGNOSIS — R19.5 CHANGE IN STOOL CALIBER: ICD-10-CM

## 2024-06-25 DIAGNOSIS — Z12.11 ENCOUNTER FOR SCREENING COLONOSCOPY: ICD-10-CM

## 2024-06-25 DIAGNOSIS — K31.A0 GASTRIC INTESTINAL METAPLASIA: ICD-10-CM

## 2024-06-25 DIAGNOSIS — R10.13 EPIGASTRIC PAIN: ICD-10-CM

## 2024-06-25 PROCEDURE — 88342 IMHCHEM/IMCYTCHM 1ST ANTB: CPT | Performed by: INTERNAL MEDICINE

## 2024-06-25 PROCEDURE — 43239 EGD BIOPSY SINGLE/MULTIPLE: CPT | Performed by: INTERNAL MEDICINE

## 2024-06-25 PROCEDURE — 25010000002 PROPOFOL 10 MG/ML EMULSION: Performed by: REGISTERED NURSE

## 2024-06-25 PROCEDURE — G0121 COLON CA SCRN NOT HI RSK IND: HCPCS | Performed by: INTERNAL MEDICINE

## 2024-06-25 PROCEDURE — 45378 DIAGNOSTIC COLONOSCOPY: CPT | Performed by: INTERNAL MEDICINE

## 2024-06-25 PROCEDURE — 88305 TISSUE EXAM BY PATHOLOGIST: CPT | Performed by: INTERNAL MEDICINE

## 2024-06-25 PROCEDURE — 25810000003 LACTATED RINGERS PER 1000 ML: Performed by: STUDENT IN AN ORGANIZED HEALTH CARE EDUCATION/TRAINING PROGRAM

## 2024-06-25 RX ORDER — SODIUM CHLORIDE, SODIUM LACTATE, POTASSIUM CHLORIDE, CALCIUM CHLORIDE 600; 310; 30; 20 MG/100ML; MG/100ML; MG/100ML; MG/100ML
9 INJECTION, SOLUTION INTRAVENOUS CONTINUOUS
Status: DISCONTINUED | OUTPATIENT
Start: 2024-06-25 | End: 2024-06-25 | Stop reason: HOSPADM

## 2024-06-25 RX ORDER — PROPOFOL 10 MG/ML
VIAL (ML) INTRAVENOUS AS NEEDED
Status: DISCONTINUED | OUTPATIENT
Start: 2024-06-25 | End: 2024-06-25 | Stop reason: SURG

## 2024-06-25 RX ORDER — SODIUM CHLORIDE 0.9 % (FLUSH) 0.9 %
3 SYRINGE (ML) INJECTION EVERY 12 HOURS SCHEDULED
Status: DISCONTINUED | OUTPATIENT
Start: 2024-06-25 | End: 2024-06-25 | Stop reason: HOSPADM

## 2024-06-25 RX ORDER — LIDOCAINE HYDROCHLORIDE 20 MG/ML
INJECTION, SOLUTION INFILTRATION; PERINEURAL AS NEEDED
Status: DISCONTINUED | OUTPATIENT
Start: 2024-06-25 | End: 2024-06-25 | Stop reason: SURG

## 2024-06-25 RX ORDER — SODIUM CHLORIDE, SODIUM LACTATE, POTASSIUM CHLORIDE, CALCIUM CHLORIDE 600; 310; 30; 20 MG/100ML; MG/100ML; MG/100ML; MG/100ML
30 INJECTION, SOLUTION INTRAVENOUS CONTINUOUS PRN
Status: DISCONTINUED | OUTPATIENT
Start: 2024-06-25 | End: 2024-06-25 | Stop reason: HOSPADM

## 2024-06-25 RX ORDER — SODIUM CHLORIDE 0.9 % (FLUSH) 0.9 %
3-10 SYRINGE (ML) INJECTION AS NEEDED
Status: DISCONTINUED | OUTPATIENT
Start: 2024-06-25 | End: 2024-06-25 | Stop reason: HOSPADM

## 2024-06-25 RX ORDER — SODIUM CHLORIDE 0.9 % (FLUSH) 0.9 %
10 SYRINGE (ML) INJECTION AS NEEDED
Status: DISCONTINUED | OUTPATIENT
Start: 2024-06-25 | End: 2024-06-25 | Stop reason: HOSPADM

## 2024-06-25 RX ADMIN — SODIUM CHLORIDE, POTASSIUM CHLORIDE, SODIUM LACTATE AND CALCIUM CHLORIDE 9 ML/HR: 600; 310; 30; 20 INJECTION, SOLUTION INTRAVENOUS at 07:46

## 2024-06-25 RX ADMIN — PROPOFOL 50 MG: 10 INJECTION, EMULSION INTRAVENOUS at 08:47

## 2024-06-25 RX ADMIN — LIDOCAINE HYDROCHLORIDE 60 MG: 20 INJECTION, SOLUTION INFILTRATION; PERINEURAL at 08:47

## 2024-06-25 RX ADMIN — PROPOFOL 150 MCG/KG/MIN: 10 INJECTION, EMULSION INTRAVENOUS at 08:47

## 2024-06-25 NOTE — H&P
No chief complaint on file.      HPI  Gerd  Epigastric pain  Screening  Gastric metaplasia         Problem List:    Patient Active Problem List   Diagnosis    Allergic rhinitis    Anxiety    HLD (hyperlipidemia)    Adult hypothyroidism    Cannot sleep    Adaptive colitis    Lung mass    Awareness of heartbeats    Borderline diabetes    B12 deficiency    Avitaminosis D    Tension headache    Upper back pain on left side    IFG (impaired fasting glucose)    Gastroesophageal reflux disease with esophagitis without hemorrhage    Gastric intestinal metaplasia    Waterbrash    Epigastric pain    Encounter for screening colonoscopy    Change in stool caliber       Medical History:    Past Medical History:   Diagnosis Date    Anxiety     Arthritis     GERD (gastroesophageal reflux disease)     HLD (hyperlipidemia)     HTN (hypertension)     Hypothyroidism     IBS (irritable bowel syndrome)     Prediabetes     Vitamin B deficiency     Vitamin D deficiency         Social History:    Social History     Socioeconomic History    Marital status:      Spouse name: Britton Proctor    Number of children: 3   Tobacco Use    Smoking status: Never    Smokeless tobacco: Never   Vaping Use    Vaping status: Never Used   Substance and Sexual Activity    Alcohol use: No    Drug use: No    Sexual activity: Not Currently     Partners: Male       Family History:   Family History   Problem Relation Age of Onset    Diabetes Mother     Hypertension Mother     Hypertension Father     Stroke Father 47    Heart disease Brother     Colon cancer Neg Hx     Colon polyps Neg Hx     Crohn's disease Neg Hx     Irritable bowel syndrome Neg Hx     Ulcerative colitis Neg Hx        Surgical History:   Past Surgical History:   Procedure Laterality Date    CHOLECYSTECTOMY      COLONOSCOPY  2016    EXPLORATORY LAPAROTOMY      UPPER GASTROINTESTINAL ENDOSCOPY  2016         Current Facility-Administered Medications:     lactated ringers infusion, 30 mL/hr,  Intravenous, Continuous PRN, Donna Rosas APRN    lactated ringers infusion, 9 mL/hr, Intravenous, Continuous, Yung Banerjee MD, Last Rate: 9 mL/hr at 06/25/24 0746, 9 mL/hr at 06/25/24 0746    sodium chloride 0.9 % flush 10 mL, 10 mL, Intravenous, PRN, Donna Rosas APRN    sodium chloride 0.9 % flush 3 mL, 3 mL, Intravenous, Q12H, Donna Rosas APRN    sodium chloride 0.9 % flush 3 mL, 3 mL, Intravenous, Q12H, Yung Banerjee MD    sodium chloride 0.9 % flush 3-10 mL, 3-10 mL, Intravenous, PRN, Yung Banerjee MD    Allergies:   Allergies   Allergen Reactions    Amoxil [Amoxicillin] Diarrhea and Rash        The following portions of the patient's history were reviewed by me and updated as appropriate: review of systems, allergies, current medications, past family history, past medical history, past social history, past surgical history and problem list.    Vitals:    06/25/24 0746   BP: 148/82   Pulse:    Resp:    Temp:    SpO2:        PHYSICAL EXAM:    CONSTITUTIONAL:  today's vital signs reviewed by me  GASTROINTESTINAL: abdomen is soft nontender nondistended with normal active bowel sounds, no masses are appreciated    Assessment/ Plan  Gerd  Epigastric pain  Screening  Gastric metaplasia    Egd and colonoscopy    Risks and benefits as well as alternatives to endoscopic evaluation were explained to the patient and they voiced understanding and wish to proceed.  These risks include but are not limited to the risk of bleeding, perforation, adverse reaction to sedation, and missed lesions.  The patient was given the opportunity to ask questions prior to the endoscopic procedure.

## 2024-06-25 NOTE — ANESTHESIA PREPROCEDURE EVALUATION
Anesthesia Evaluation     Patient summary reviewed and Nursing notes reviewed   no history of anesthetic complications:   NPO Solid Status: > 8 hours  NPO Liquid Status: > 2 hours           Airway   Dental      Pulmonary    Cardiovascular     ECG reviewed    (+) hypertension    ROS comment: EKG unremarkable    Neuro/Psych  (+) psychiatric history Anxiety  GI/Hepatic/Renal/Endo    (+) GERD, thyroid problem hypothyroidism    Musculoskeletal     Abdominal    Substance History      OB/GYN          Other                          Anesthesia Plan    ASA 2     MAC     intravenous induction     Anesthetic plan, risks, benefits, and alternatives have been provided, discussed and informed consent has been obtained with: patient.        CODE STATUS:

## 2024-06-25 NOTE — ANESTHESIA POSTPROCEDURE EVALUATION
Patient: Mariana Proctor    Procedure Summary       Date: 06/25/24 Room / Location: [facility]  / [facility]    Anesthesia Start: 0837 Anesthesia Stop: 0915    Procedures:       ESOPHAGOGASTRODUODENOSCOPY      COLONOSCOPY FOR SCREENING to [facility] Diagnosis:       Gastric intestinal metaplasia      Change in stool caliber      Gastroesophageal reflux disease with esophagitis without hemorrhage      Waterbrash      Epigastric pain      Encounter for screening colonoscopy      (Gastric intestinal metaplasia [K31.A0])      (Change in stool caliber [R19.5])      (Gastroesophageal reflux disease with esophagitis without hemorrhage [K21.00])      (Waterbrash [R12])      (Epigastric pain [R10.13])      (Encounter for screening colonoscopy [Z12.11])    Surgeons: Jt Gerber MD Provider: Yung Banerjee MD    Anesthesia Type: MAC ASA Status: 2            Anesthesia Type: MAC    Vitals  Vitals Value Taken Time   /77 06/25/24 0931   Temp 36.3 °C (97.3 °F) 06/25/24 0912   Pulse 61 06/25/24 0931   Resp 16 06/25/24 0922   SpO2 100 % 06/25/24 0931   Vitals shown include unfiled device data.        Post Anesthesia Care and Evaluation    Patient location during evaluation: bedside  Patient participation: complete - patient participated  Level of consciousness: awake and alert  Pain management: adequate    Airway patency: patent  Anesthetic complications: No anesthetic complications  PONV Status: controlled  Cardiovascular status: blood pressure returned to baseline and acceptable  Respiratory status: acceptable  Hydration status: acceptable

## 2024-06-27 LAB
LAB AP CASE REPORT: NORMAL
LAB AP DIAGNOSIS COMMENT: NORMAL
PATH REPORT.ADDENDUM SPEC: NORMAL
PATH REPORT.FINAL DX SPEC: NORMAL
PATH REPORT.GROSS SPEC: NORMAL

## 2024-07-15 ENCOUNTER — OFFICE VISIT (OUTPATIENT)
Dept: GASTROENTEROLOGY | Facility: CLINIC | Age: 69
End: 2024-07-15
Payer: MEDICARE

## 2024-07-15 VITALS
HEART RATE: 70 BPM | HEIGHT: 60 IN | DIASTOLIC BLOOD PRESSURE: 60 MMHG | TEMPERATURE: 96.8 F | WEIGHT: 129.1 LBS | OXYGEN SATURATION: 98 % | SYSTOLIC BLOOD PRESSURE: 100 MMHG | BODY MASS INDEX: 25.35 KG/M2

## 2024-07-15 DIAGNOSIS — K21.00 GASTROESOPHAGEAL REFLUX DISEASE WITH ESOPHAGITIS WITHOUT HEMORRHAGE: Primary | ICD-10-CM

## 2024-07-15 DIAGNOSIS — R10.13 EPIGASTRIC PAIN: ICD-10-CM

## 2024-07-15 DIAGNOSIS — K58.2 IRRITABLE BOWEL SYNDROME WITH ALTERNATING BOWEL HABITS: ICD-10-CM

## 2024-07-15 DIAGNOSIS — K31.A0 GASTRIC INTESTINAL METAPLASIA: ICD-10-CM

## 2024-07-15 RX ORDER — PANTOPRAZOLE SODIUM 40 MG/1
40 TABLET, DELAYED RELEASE ORAL DAILY
Qty: 90 TABLET | Refills: 3 | Status: SHIPPED | OUTPATIENT
Start: 2024-07-15

## 2024-07-15 NOTE — PATIENT INSTRUCTIONS
For GERD:    Follow antireflux precautions:    Start pantoprazole 40 mg once daily before lunch or dinner since this medication cannot be taken with food   If you symptoms do improve let us know to complete further work up   Avoiding eating within 3 to 4 hours of bedtime.    Avoid foods that can trigger symptoms which may include:  citrus fruits  spicy foods,  Tomatoes  Red sauces   Chocolate  coffee/tea  caffeinated or carbonated beverages  alcohol    We recommend starting a daily fiber supplement such as Metamucil, Benefiber, Citrucel       As well as consuming at least 20 to 30 g of dietary fiber per day  This helps to keep stool soft and formed and easy transit throughout the colon to produce regular and complete bowel movements.  When starting fiber regimen recommend starting with a low-dose and gradually increasing by 1 tablespoon every 7 days as tolerated.  This will help your body acclimate to the higher fiber diet and reduce risk of unwanted side effects that include bloating, gas, abdominal fullness, and cramping  For example: Begin taking 1 tablespoon daily for at least 7 days, if this is not successful in producing regular bowel movements can begin taking 1 tablespoons twice a day, and finally if this is not successful after taking 2 tablespoons for 7 days, can further increase to maximum recommended dosing of 1 tablespoon 3 times per day.  It is important to consume increased amounts of fluid throughout the day to help improve the effectiveness of the fiber supplementation  Avoid gummy formulations of fiber as this can further increase your risk of the above adverse effects due to artificial sweeteners in the Gummies.  Fiber supplements can take 12 to 72 hours to start working. Make sure to drink 6 to 12 ounces of water or noncarbonated beverage with fiber supplement.

## 2024-07-15 NOTE — PROGRESS NOTES
"Chief Complaint   Patient presents with    Follow-up     Post procedure follow up           History of Present Illness    Patient is a 69 y.o. who presents to the office for follow up evaluation.  Last in office visit was on  4/174/2024 . Patient has a significant past medical history of GERD, intestinal metaplasia on 2019 EGD, IBS, hypothyroidism.    6/25/2024 EGD and Colonoscopy Evaluation:     Normal esophagus.   Gastritis.  Path: Mild reactive/chemical gastropathy with focal intestinal metaplasia without significant inflammation  Normal examined duodenum.  The examination was otherwise normal.  Next EGD recommended in 3 years for surveillance of intestinal metaplasia recall active in EMR.    Colonoscopy:    Endoscopically normal-appearing colon and terminal ileum  Nonbleeding internal hemorrhoids  No specimens obtained  Next colonoscopy for colon cancer screening purposes due in 10 years    She denies current use of antireflux medications at this time secondary to difficulty remembering pantoprazole dosing.  Denies nausea or vomiting however has noticed a recurrence and upper abdominal pain.  Denies dysphagia however does experience slower transit of Tika and rice compared to other texture of foods.  She also attributes this to quick eating habits.    Denies abdominal pain.    Bowel habits are described as occurring sporadically and alternating between days of 1-3 bowel movements per day combined with absence of bowel movements for 48 to 72 hours.  Denies melena or hematochezia.  Denies use of fiber supplementation.    Denies unintentional weight loss     Patient denies known family history of colon polyps, colon cancer, or IBD.       Result Review :       Office Visit with Donna Rosas APRN (04/17/2024)   Upper GI Endoscopy (06/25/2024 07:31)  Colonoscopy (06/25/2024 07:21)  Tissue Pathology Exam (06/25/2024 08:54)   Vital Signs:   /60   Pulse 70   Temp 96.8 °F (36 °C)   Ht 152.4 cm (60\")   Wt " 58.6 kg (129 lb 1.6 oz)   SpO2 98%   BMI 25.21 kg/m²     Body mass index is 25.21 kg/m².     Physical Exam  Vitals reviewed.   Constitutional:       General: She is not in acute distress.     Appearance: Normal appearance. She is well-developed. She is not diaphoretic.   HENT:      Head: Normocephalic and atraumatic.   Eyes:      General: No scleral icterus.  Cardiovascular:      Rate and Rhythm: Normal rate and regular rhythm.   Pulmonary:      Effort: Pulmonary effort is normal. No respiratory distress.      Breath sounds: Normal breath sounds.   Abdominal:      General: Bowel sounds are normal. There is no distension.      Palpations: Abdomen is soft. There is no mass.      Tenderness: There is no abdominal tenderness. There is no guarding or rebound.      Hernia: No hernia is present.   Skin:     General: Skin is warm and dry.      Coloration: Skin is not jaundiced.   Neurological:      Mental Status: She is alert and oriented to person, place, and time.   Psychiatric:         Mood and Affect: Mood normal.         Behavior: Behavior normal.         Thought Content: Thought content normal.         Judgment: Judgment normal.           Assessment and Plan    Diagnoses and all orders for this visit:    1. Gastroesophageal reflux disease with esophagitis without hemorrhage (Primary)  -     pantoprazole (PROTONIX) 40 MG EC tablet; Take 1 tablet by mouth Daily.  Dispense: 90 tablet; Refill: 3    2. Gastric intestinal metaplasia  -     pantoprazole (PROTONIX) 40 MG EC tablet; Take 1 tablet by mouth Daily.  Dispense: 90 tablet; Refill: 3    3. Irritable bowel syndrome with alternating bowel habits    4. Epigastric pain           Discussion:    Patient is a pleasant 69-year-old female who presents today following completion of EGD and colonoscopy evaluation.  Reviewed endoscopic results at length with patient as well as recommendation to restart pantoprazole.  We did discuss at length the mechanism of action of  medication and impact medication can have on healing of witnessed gastritis on recent EGD evaluation.    We also discussed plans for surveillance EGD in 3 years due 6/20/2027 with recall active in EMR per Dr. Gerber's recommendations regarding intestinal metaplasia.     I also offered prescription for sucralfate 1 g to be taken 2-3 times a day prior to meals and at bedtime however patient declined at this time with plans to contact her office after taking pantoprazole for 4 weeks if symptoms do not improve as expected.  She is also aware of importance of contacting our office if dysphagia symptoms worsen or do not improve with restarting of pantoprazole.    Information provided on dietary modifications to improve dysphagia symptoms as well as complete mastication of food and mindful eating habits.      For alternating bowel habits, recommend starting fiber supplementation with written instructions provided on dosing.  Further recommendations to be made pending results of the above work-up and clinical course.    Patient is agreeable to the outlined above treatment plan.  Verbalizes understanding and will contact office for any new or worsening concerns.  All questions answered and support provided.        Patient Instructions   For GERD:    Follow antireflux precautions:    Start pantoprazole 40 mg once daily before lunch or dinner since this medication cannot be taken with food   If you symptoms do improve let us know to complete further work up   Avoiding eating within 3 to 4 hours of bedtime.    Avoid foods that can trigger symptoms which may include:  citrus fruits  spicy foods,  Tomatoes  Red sauces   Chocolate  coffee/tea  caffeinated or carbonated beverages  alcohol    We recommend starting a daily fiber supplement such as Metamucil, Benefiber, Citrucel       As well as consuming at least 20 to 30 g of dietary fiber per day  This helps to keep stool soft and formed and easy transit throughout the colon to  produce regular and complete bowel movements.  When starting fiber regimen recommend starting with a low-dose and gradually increasing by 1 tablespoon every 7 days as tolerated.  This will help your body acclimate to the higher fiber diet and reduce risk of unwanted side effects that include bloating, gas, abdominal fullness, and cramping  For example: Begin taking 1 tablespoon daily for at least 7 days, if this is not successful in producing regular bowel movements can begin taking 1 tablespoons twice a day, and finally if this is not successful after taking 2 tablespoons for 7 days, can further increase to maximum recommended dosing of 1 tablespoon 3 times per day.  It is important to consume increased amounts of fluid throughout the day to help improve the effectiveness of the fiber supplementation  Avoid gummy formulations of fiber as this can further increase your risk of the above adverse effects due to artificial sweeteners in the Gummies.  Fiber supplements can take 12 to 72 hours to start working. Make sure to drink 6 to 12 ounces of water or noncarbonated beverage with fiber supplement.              EMR Dragon/Transcription Disclaimer:  This document has been Dictated utilizing Dragon dictation.

## 2024-11-29 DIAGNOSIS — E03.9 ADULT HYPOTHYROIDISM: Chronic | ICD-10-CM

## 2024-12-02 RX ORDER — LEVOTHYROXINE SODIUM 88 MCG
88 TABLET ORAL DAILY
Qty: 90 TABLET | Refills: 3 | OUTPATIENT
Start: 2024-12-02

## 2024-12-02 NOTE — TELEPHONE ENCOUNTER
Caller: Mariana Proctor    Relationship: Self    Best call back number: [phone]     Requested Prescriptions:   Requested Prescriptions     Pending Prescriptions Disp Refills    Synthroid 88 MCG tablet [Pharmacy Med Name: SYNTHROID TABS 88MCG] 90 tablet 3     Sig: TAKE 1 TABLET DAILY        Pharmacy where request should be sent: [facility] [address] [phone] [facility] [phone] FX     Last office visit with prescribing clinician: 6/5/2024   Last telemedicine visit with prescribing clinician: Visit date not found   Next office visit with prescribing clinician: Visit date not found     Additional details provided by patient: PATIENT ONLY HAS 5 PILLS REMAINING    Does the patient have less than a 3 day supply:  [x] Yes  [] No    Would you like a call back once the refill request has been completed: [] Yes [] No    If the office needs to give you a call back, can they leave a voicemail: [] Yes [] No    Ayala Gerber Rep   12/02/24 08:47 EST

## 2024-12-03 DIAGNOSIS — E03.9 ADULT HYPOTHYROIDISM: Chronic | ICD-10-CM

## 2024-12-03 RX ORDER — LEVOTHYROXINE SODIUM 88 UG/1
88 TABLET ORAL DAILY
Qty: 90 TABLET | Refills: 3 | OUTPATIENT
Start: 2024-12-03

## 2024-12-03 NOTE — TELEPHONE ENCOUNTER
Caller: Mariana Proctor    Relationship: Self    Best call back number: [phone]     Requested Prescriptions:   Requested Prescriptions     Pending Prescriptions Disp Refills    levothyroxine (SYNTHROID, LEVOTHROID) 88 MCG tablet 90 tablet 3     Sig: Take 1 tablet by mouth Daily. Give Name Brand Synthroid only        Pharmacy where request should be sent: [facility] - [address] [phone] [facility] [phone]      Last office visit with prescribing clinician: 6/5/2024   Last telemedicine visit with prescribing clinician: Visit date not found   Next office visit with prescribing clinician: Visit date not found     Additional details provided by patient: PATIENT STATES THAT [facility] TOLD HER SHE IS OUT OF REFILLS.    Does the patient have less than a 3 day supply:  [x] Yes  [] No    Ayala Zimmerman Rep   12/03/24 11:35 EST

## 2024-12-04 DIAGNOSIS — E03.9 ADULT HYPOTHYROIDISM: Chronic | ICD-10-CM

## 2024-12-04 RX ORDER — LEVOTHYROXINE SODIUM 88 UG/1
88 TABLET ORAL DAILY
Qty: 90 TABLET | Refills: 3 | Status: CANCELLED | OUTPATIENT
Start: 2024-12-04

## 2024-12-04 NOTE — TELEPHONE ENCOUNTER
Last visit: 6-5-24  Next visit:   Return in about 1 year (around 6/5/2025) for Recheck.    PT WAITING ON RX FROM [facility], NEEDS SHORT SUPPLY

## 2024-12-05 DIAGNOSIS — K31.A0 GASTRIC INTESTINAL METAPLASIA: ICD-10-CM

## 2024-12-05 DIAGNOSIS — K21.00 GASTROESOPHAGEAL REFLUX DISEASE WITH ESOPHAGITIS WITHOUT HEMORRHAGE: ICD-10-CM

## 2024-12-05 DIAGNOSIS — E03.9 ADULT HYPOTHYROIDISM: Chronic | ICD-10-CM

## 2024-12-05 RX ORDER — LIOTHYRONINE SODIUM 5 UG/1
5 TABLET ORAL DAILY
Qty: 30 TABLET | Refills: 3 | Status: SHIPPED | OUTPATIENT
Start: 2024-12-05

## 2024-12-05 RX ORDER — LEVOTHYROXINE SODIUM 88 UG/1
88 TABLET ORAL DAILY
Qty: 30 TABLET | Refills: 3 | Status: SHIPPED | OUTPATIENT
Start: 2024-12-05

## 2024-12-05 RX ORDER — PANTOPRAZOLE SODIUM 40 MG/1
40 TABLET, DELAYED RELEASE ORAL DAILY
Qty: 30 TABLET | Refills: 3 | Status: SHIPPED | OUTPATIENT
Start: 2024-12-05

## 2024-12-05 NOTE — TELEPHONE ENCOUNTER
Hub staff attempted to follow warm transfer process and was unsuccessful     Caller:     Relationship to patient:     Best call back number: [phone].     Patient is needing: PATIENT STATED THAT SHE HAS TWO PILLS LEFT OF MEDICATION. PATIENT IS REQUESTING REFILL.

## 2024-12-09 NOTE — PROGRESS NOTES
"  Chief Complaint:   Chief Complaint   Patient presents with    Hypothyroidism     Med refill   Pharm [facility]   No labs     Heartburn       Mariana Proctor 69 y.o. female who presents today for Medical Management of the below listed issues. She  has a problem list of   Patient Active Problem List   Diagnosis    Allergic rhinitis    Anxiety    HLD (hyperlipidemia)    Adult hypothyroidism    Cannot sleep    Adaptive colitis    Lung mass    Awareness of heartbeats    Borderline diabetes    B12 deficiency    Avitaminosis D    Tension headache    Upper back pain on left side    IFG (impaired fasting glucose)    Gastroesophageal reflux disease with esophagitis without hemorrhage    Gastric intestinal metaplasia    Waterbrash    Epigastric pain    Encounter for screening colonoscopy    Change in stool caliber    Irritable bowel syndrome with alternating bowel habits   .  Since the last visit, She has overall felt well.  she has been compliant with   Current Outpatient Medications:     levothyroxine (SYNTHROID, LEVOTHROID) 88 MCG tablet, Take 1 tablet by mouth Daily. Give Name Brand Synthroid only, Disp: 90 tablet, Rfl: 3    liothyronine (Cytomel) 5 MCG tablet, Take 1 tablet by mouth Daily., Disp: 90 tablet, Rfl: 3    pantoprazole (PROTONIX) 40 MG EC tablet, Take 1 tablet by mouth Daily., Disp: 90 tablet, Rfl: 3.  She denies medication side effects.    All of the other chronic condition(s) listed above are stable w/o issues.    /82   Pulse 60   Temp 97.6 °F (36.4 °C) (Oral)   Resp 14   Ht 152.4 cm (60\")   Wt 58.5 kg (129 lb)   SpO2 97%   BMI 25.19 kg/m²     Results for orders placed or performed in visit on 07/12/24   T3    Collection Time: 07/30/24  9:43 AM    Specimen: Blood   Result Value Ref Range    T3, Total 80.8 80.0 - 200.0 ng/dl   T4, Free    Collection Time: 07/30/24  9:43 AM    Specimen: Blood   Result Value Ref Range    Free T4 1.64 0.92 - 1.68 ng/dL   TSH    Collection Time: 07/30/24  9:43 AM    " Specimen: Blood   Result Value Ref Range    TSH 0.364 0.270 - 4.200 uIU/mL             The following portions of the patient's history were reviewed and updated as appropriate: allergies, current medications, past family history, past medical history, past social history, past surgical history, and problem list.    Review of Systems   Constitutional:  Negative for activity change, chills and fever.   Respiratory:  Negative for cough.    Cardiovascular:  Negative for chest pain.   Psychiatric/Behavioral:  Negative for dysphoric mood.        Objective             Physical Exam  Vitals and nursing note reviewed.   Constitutional:       General: She is not in acute distress.     Appearance: She is well-developed.   Cardiovascular:      Rate and Rhythm: Normal rate and regular rhythm.   Pulmonary:      Effort: Pulmonary effort is normal.      Breath sounds: Normal breath sounds.   Neurological:      Mental Status: She is alert and oriented to person, place, and time.   Psychiatric:         Behavior: Behavior normal.         Thought Content: Thought content normal.             Diagnoses and all orders for this visit:    1. Adult hypothyroidism (Primary)  -     levothyroxine (SYNTHROID, LEVOTHROID) 88 MCG tablet; Take 1 tablet by mouth Daily. Give Name Brand Synthroid only  Dispense: 90 tablet; Refill: 3  -     liothyronine (Cytomel) 5 MCG tablet; Take 1 tablet by mouth Daily.  Dispense: 90 tablet; Refill: 3  -     T3  -     T4, Free  -     TSH    2. Gastroesophageal reflux disease with esophagitis without hemorrhage  -     pantoprazole (PROTONIX) 40 MG EC tablet; Take 1 tablet by mouth Daily.  Dispense: 90 tablet; Refill: 3    3. Gastric intestinal metaplasia  -     pantoprazole (PROTONIX) 40 MG EC tablet; Take 1 tablet by mouth Daily.  Dispense: 90 tablet; Refill: 3    4. IFG (impaired fasting glucose)  -     Basic Metabolic Panel  -     Hemoglobin A1c    Diet/exercise/weight management to treat the glucose  discussed.

## 2024-12-11 ENCOUNTER — OFFICE VISIT (OUTPATIENT)
Dept: FAMILY MEDICINE CLINIC | Facility: CLINIC | Age: 69
End: 2024-12-11
Payer: MEDICARE

## 2024-12-11 VITALS
DIASTOLIC BLOOD PRESSURE: 82 MMHG | SYSTOLIC BLOOD PRESSURE: 134 MMHG | OXYGEN SATURATION: 97 % | HEART RATE: 60 BPM | TEMPERATURE: 97.6 F | BODY MASS INDEX: 25.32 KG/M2 | RESPIRATION RATE: 14 BRPM | HEIGHT: 60 IN | WEIGHT: 129 LBS

## 2024-12-11 DIAGNOSIS — K21.00 GASTROESOPHAGEAL REFLUX DISEASE WITH ESOPHAGITIS WITHOUT HEMORRHAGE: Chronic | ICD-10-CM

## 2024-12-11 DIAGNOSIS — R73.01 IFG (IMPAIRED FASTING GLUCOSE): ICD-10-CM

## 2024-12-11 DIAGNOSIS — E03.9 ADULT HYPOTHYROIDISM: Primary | Chronic | ICD-10-CM

## 2024-12-11 DIAGNOSIS — K31.A0 GASTRIC INTESTINAL METAPLASIA: Chronic | ICD-10-CM

## 2024-12-11 LAB
BUN SERPL-MCNC: 12 MG/DL (ref 8–23)
BUN/CREAT SERPL: 15.8 (ref 7–25)
CALCIUM SERPL-MCNC: 9.3 MG/DL (ref 8.6–10.5)
CHLORIDE SERPL-SCNC: 103 MMOL/L (ref 98–107)
CO2 SERPL-SCNC: 28.9 MMOL/L (ref 22–29)
CREAT SERPL-MCNC: 0.76 MG/DL (ref 0.57–1)
EGFRCR SERPLBLD CKD-EPI 2021: 86 ML/MIN/1.73
GLUCOSE SERPL-MCNC: 106 MG/DL (ref 65–99)
HBA1C MFR BLD: 5.8 % (ref 4.8–5.6)
POTASSIUM SERPL-SCNC: 4.3 MMOL/L (ref 3.5–5.2)
SODIUM SERPL-SCNC: 140 MMOL/L (ref 136–145)
T3 SERPL-MCNC: 58.5 NG/DL (ref 80–200)
T4 FREE SERPL-MCNC: 1.02 NG/DL (ref 0.92–1.68)
TSH SERPL DL<=0.005 MIU/L-ACNC: 3.32 UIU/ML (ref 0.27–4.2)

## 2024-12-11 PROCEDURE — 99214 OFFICE O/P EST MOD 30 MIN: CPT | Performed by: FAMILY MEDICINE

## 2024-12-11 PROCEDURE — 1160F RVW MEDS BY RX/DR IN RCRD: CPT | Performed by: FAMILY MEDICINE

## 2024-12-11 PROCEDURE — 1159F MED LIST DOCD IN RCRD: CPT | Performed by: FAMILY MEDICINE

## 2024-12-11 PROCEDURE — 1126F AMNT PAIN NOTED NONE PRSNT: CPT | Performed by: FAMILY MEDICINE

## 2024-12-11 RX ORDER — LEVOTHYROXINE SODIUM 88 UG/1
88 TABLET ORAL DAILY
Qty: 90 TABLET | Refills: 3 | Status: SHIPPED | OUTPATIENT
Start: 2024-12-11

## 2024-12-11 RX ORDER — PANTOPRAZOLE SODIUM 40 MG/1
40 TABLET, DELAYED RELEASE ORAL DAILY
Qty: 90 TABLET | Refills: 3 | Status: SHIPPED | OUTPATIENT
Start: 2024-12-11

## 2024-12-11 RX ORDER — LIOTHYRONINE SODIUM 5 UG/1
5 TABLET ORAL DAILY
Qty: 90 TABLET | Refills: 3 | Status: SHIPPED | OUTPATIENT
Start: 2024-12-11 | End: 2024-12-12 | Stop reason: SDUPTHER

## 2024-12-12 ENCOUNTER — TELEPHONE (OUTPATIENT)
Dept: FAMILY MEDICINE CLINIC | Facility: CLINIC | Age: 69
End: 2024-12-12
Payer: MEDICARE

## 2024-12-12 DIAGNOSIS — E03.9 ADULT HYPOTHYROIDISM: Chronic | ICD-10-CM

## 2024-12-12 RX ORDER — LIOTHYRONINE SODIUM 5 UG/1
5 TABLET ORAL 2 TIMES DAILY
Qty: 180 TABLET | Refills: 3 | Status: SHIPPED | OUTPATIENT
Start: 2024-12-12

## 2024-12-12 NOTE — TELEPHONE ENCOUNTER
Left message for pt regarding lab results  Message also sent through my chart  Ok for the hub to relay message   Patient's liothyronine/Cytomel is not fully effective, so I increase that tablet just now to twice a day from once a day, and I ordered a repeat nonfasting thyroid lab for the second week of January.  Please make patient aware that she needs to complete labs for that at that time.  She is to continue all other medications and to continue trying to work on getting her glucose under 100 through daily exercise and healthy diet.

## 2024-12-12 NOTE — TELEPHONE ENCOUNTER
----- Message from Jori Reinoso sent at 12/12/2024 12:49 PM EST -----  Patient's liothyronine/Cytomel is not fully effective, so I increase that tablet just now to twice a day from once a day, and I ordered a repeat nonfasting thyroid lab for the second week of January.  Please make patient aware that she needs to complete labs for that at that time.  She is to continue all other medications and to continue trying to work on getting her glucose under 100 through daily exercise and healthy diet.

## 2024-12-13 NOTE — TELEPHONE ENCOUNTER
Pt needed Trade name of Levothyroxine.   Called [facility] pharmacy gave further instruction for Trade name.

## 2024-12-13 NOTE — TELEPHONE ENCOUNTER
Name: Mariana Proctor      Relationship: Self      Best Callback Number: [phone]       HUB PROVIDED THE RELAY MESSAGE FROM THE OFFICE      PATIENT: HAS FURTHER QUESTIONS AND WOULD LIKE A CALL BACK AT THE FOLLOWING PHONE NUMBER [phone]    ADDITIONAL INFORMATION: PATIENT HAS ADDITIONAL QUESTIONS ABOUT MEDICATIONS SHE IS SUPPOSED TO BE TAKING FOR THYROID.

## 2025-02-06 ENCOUNTER — OFFICE VISIT (OUTPATIENT)
Dept: GASTROENTEROLOGY | Facility: CLINIC | Age: 70
End: 2025-02-06
Payer: MEDICARE

## 2025-02-06 VITALS
WEIGHT: 133 LBS | HEART RATE: 72 BPM | TEMPERATURE: 97 F | OXYGEN SATURATION: 98 % | HEIGHT: 60 IN | SYSTOLIC BLOOD PRESSURE: 136 MMHG | DIASTOLIC BLOOD PRESSURE: 82 MMHG | BODY MASS INDEX: 26.11 KG/M2

## 2025-02-06 DIAGNOSIS — K31.A0 GASTRIC INTESTINAL METAPLASIA: ICD-10-CM

## 2025-02-06 DIAGNOSIS — K59.00 INFREQUENT BOWEL MOVEMENTS: Primary | ICD-10-CM

## 2025-02-06 PROCEDURE — 1160F RVW MEDS BY RX/DR IN RCRD: CPT | Performed by: NURSE PRACTITIONER

## 2025-02-06 PROCEDURE — 99212 OFFICE O/P EST SF 10 MIN: CPT | Performed by: NURSE PRACTITIONER

## 2025-02-06 PROCEDURE — 1159F MED LIST DOCD IN RCRD: CPT | Performed by: NURSE PRACTITIONER

## 2025-02-06 NOTE — PATIENT INSTRUCTIONS
plans for surveillance EGD in 3 years due 6/20/2027 with recall active in EMR per Dr. Gerber's recommendations regarding intestinal metaplasia.     When gastritis on previous EGDs, recommend pantoprazole daily to help symptoms of acid reflux.    For irregular bowel movements, recommend over-the-counter herbal tea, SmootheMove tea.    Average colon cancer risk, next colonoscopy for colon cancer screening purposes due in 10 years, June 2034 with Dr. Gerber    Follow-up visit yearly, sooner if symptoms change or condition warrants.

## 2025-02-06 NOTE — PROGRESS NOTES
"        History of Present Illness  Patient is a 69 y.o. who presents to the office for follow up evaluation.    Last in office visit July 15, 2024 with RONNA Magallanes.  Patient has a significant past medical history of GERD, intestinal metaplasia on EGD in 2019 and and 6/2024, IBS, hypothyroidism.   Average colon cancer risk, next colonoscopy for colon cancer screening purposes due in 10 years, June 2034 with Dr. Gerber    Patient reports hesitancy to take medications on a regular basis, she does not feel that pantoprazole resolves acid reflux symptoms and takes it intermittently.    She also reports irregular bowel patterns, she can go up to 72 hours without a bowel movement then she will have urgent loose bowel movements several for the day.  She has been told to avoid foods high in fiber due to her thyroid.  No previous interventions.      Result Review :         Office Visit with Donna Rosas APRN (07/15/2024)  Vital Signs:   /82   Pulse 72   Temp 97 °F (36.1 °C)   Ht 152.4 cm (60\")   Wt 60.3 kg (133 lb)   SpO2 98%   BMI 25.97 kg/m²     Body mass index is 25.97 kg/m².     Physical Exam  Vitals reviewed.   Constitutional:       General: She is not in acute distress.     Appearance: Normal appearance. She is not ill-appearing or toxic-appearing.   Eyes:      General: No scleral icterus.  Pulmonary:      Effort: Pulmonary effort is normal. No respiratory distress.   Skin:     Coloration: Skin is not jaundiced.   Neurological:      Mental Status: She is alert and oriented to person, place, and time.   Psychiatric:         Mood and Affect: Mood normal.         Behavior: Behavior normal.         Thought Content: Thought content normal.         Judgment: Judgment normal.           Assessment and Plan    Diagnoses and all orders for this visit:    1. Infrequent bowel movements (Primary)    2. Gastric intestinal metaplasia     plans for surveillance EGD in 3 years due 6/20/2027 with recall active " in EMR per Dr. Gerber's recommendations regarding intestinal metaplasia.     When gastritis on previous EGDs, recommend pantoprazole daily to help symptoms of acid reflux.    For irregular bowel movements, recommend over-the-counter herbal tea, SmootheMove tea.    Average colon cancer risk, next colonoscopy for colon cancer screening purposes due in 10 years, June 2034 with Dr. Gerber    Follow-up visit yearly, sooner if symptoms change or condition warrants.            Patient Instructions       plans for surveillance EGD in 3 years due 6/20/2027 with recall active in EMR per Dr. Gerber's recommendations regarding intestinal metaplasia.     When gastritis on previous EGDs, recommend pantoprazole daily to help symptoms of acid reflux.    For irregular bowel movements, recommend over-the-counter herbal tea, SmootheMove tea.    Average colon cancer risk, next colonoscopy for colon cancer screening purposes due in 10 years, June 2034 with Dr. Gerber    Follow-up visit yearly, sooner if symptoms change or condition warrants.        EMR Dragon/Transcription Disclaimer:  This document has been Dictated utilizing Dragon dictation.

## (undated) DEVICE — MSK ENDO PORT O2 POM ELITE CURAPLEX A/

## (undated) DEVICE — SINGLE-USE BIOPSY FORCEPS: Brand: RADIAL JAW 4

## (undated) DEVICE — FLEX ADVANTAGE 1500CC: Brand: FLEX ADVANTAGE

## (undated) DEVICE — KT ORCA ORCAPOD DISP STRL

## (undated) DEVICE — VIAL FORMLN CAP 10PCT 20ML

## (undated) DEVICE — GOWN ISOL W/THUMB UNIV BLU BX/15

## (undated) DEVICE — CANN O2 ETCO2 FITS ALL CONN CO2 SMPL A/ 7IN DISP LF

## (undated) DEVICE — ADAPT CLN SCPE ENDO PORPOISE BX/50 DISP

## (undated) DEVICE — GOWN PROC ENDOARMOR GI LVL3 HY/SHLD UNIV

## (undated) DEVICE — Device

## (undated) DEVICE — BITEBLOCK OMNI BLOC

## (undated) DEVICE — SYRINGE, LUER SLIP, STERILE, 60ML: Brand: MEDLINE